# Patient Record
Sex: MALE | Race: WHITE | NOT HISPANIC OR LATINO | URBAN - METROPOLITAN AREA
[De-identification: names, ages, dates, MRNs, and addresses within clinical notes are randomized per-mention and may not be internally consistent; named-entity substitution may affect disease eponyms.]

---

## 2017-05-31 ENCOUNTER — EMERGENCY (EMERGENCY)
Facility: HOSPITAL | Age: 47
LOS: 1 days | Discharge: HOME | End: 2017-05-31
Attending: EMERGENCY MEDICINE | Admitting: EMERGENCY MEDICINE
Payer: COMMERCIAL

## 2017-05-31 VITALS
SYSTOLIC BLOOD PRESSURE: 139 MMHG | WEIGHT: 250 LBS | TEMPERATURE: 98 F | DIASTOLIC BLOOD PRESSURE: 96 MMHG | RESPIRATION RATE: 19 BRPM | OXYGEN SATURATION: 100 % | HEART RATE: 75 BPM

## 2017-05-31 LAB
ALBUMIN SERPL ELPH-MCNC: 4.5 G/DL — SIGNIFICANT CHANGE UP (ref 3.3–5)
ALP SERPL-CCNC: 51 U/L — SIGNIFICANT CHANGE UP (ref 40–120)
ALT FLD-CCNC: 24 U/L — SIGNIFICANT CHANGE UP (ref 4–41)
APTT BLD: 38.6 SEC — HIGH (ref 27.5–37.4)
AST SERPL-CCNC: 23 U/L — SIGNIFICANT CHANGE UP (ref 4–40)
BASOPHILS # BLD AUTO: 0.01 K/UL — SIGNIFICANT CHANGE UP (ref 0–0.2)
BASOPHILS NFR BLD AUTO: 0.2 % — SIGNIFICANT CHANGE UP (ref 0–2)
BILIRUB SERPL-MCNC: 0.3 MG/DL — SIGNIFICANT CHANGE UP (ref 0.2–1.2)
BUN SERPL-MCNC: 17 MG/DL — SIGNIFICANT CHANGE UP (ref 7–23)
CALCIUM SERPL-MCNC: 9.7 MG/DL — SIGNIFICANT CHANGE UP (ref 8.4–10.5)
CHLORIDE SERPL-SCNC: 102 MMOL/L — SIGNIFICANT CHANGE UP (ref 98–107)
CO2 SERPL-SCNC: 28 MMOL/L — SIGNIFICANT CHANGE UP (ref 22–31)
CREAT SERPL-MCNC: 1.25 MG/DL — SIGNIFICANT CHANGE UP (ref 0.5–1.3)
EOSINOPHIL # BLD AUTO: 0.13 K/UL — SIGNIFICANT CHANGE UP (ref 0–0.5)
EOSINOPHIL NFR BLD AUTO: 2.1 % — SIGNIFICANT CHANGE UP (ref 0–6)
GLUCOSE SERPL-MCNC: 111 MG/DL — HIGH (ref 70–99)
HCT VFR BLD CALC: 45 % — SIGNIFICANT CHANGE UP (ref 39–50)
HGB BLD-MCNC: 14.7 G/DL — SIGNIFICANT CHANGE UP (ref 13–17)
IMM GRANULOCYTES NFR BLD AUTO: 0.2 % — SIGNIFICANT CHANGE UP (ref 0–1.5)
INR BLD: 1.51 — HIGH (ref 0.88–1.17)
LYMPHOCYTES # BLD AUTO: 3.22 K/UL — SIGNIFICANT CHANGE UP (ref 1–3.3)
LYMPHOCYTES # BLD AUTO: 51.5 % — HIGH (ref 13–44)
MCHC RBC-ENTMCNC: 32.7 % — SIGNIFICANT CHANGE UP (ref 32–36)
MCHC RBC-ENTMCNC: 32.7 PG — SIGNIFICANT CHANGE UP (ref 27–34)
MCV RBC AUTO: 100.2 FL — HIGH (ref 80–100)
MONOCYTES # BLD AUTO: 0.67 K/UL — SIGNIFICANT CHANGE UP (ref 0–0.9)
MONOCYTES NFR BLD AUTO: 10.7 % — SIGNIFICANT CHANGE UP (ref 2–14)
NEUTROPHILS # BLD AUTO: 2.21 K/UL — SIGNIFICANT CHANGE UP (ref 1.8–7.4)
NEUTROPHILS NFR BLD AUTO: 35.3 % — LOW (ref 43–77)
PLATELET # BLD AUTO: 221 K/UL — SIGNIFICANT CHANGE UP (ref 150–400)
PMV BLD: 9.5 FL — SIGNIFICANT CHANGE UP (ref 7–13)
POTASSIUM SERPL-MCNC: 4.5 MMOL/L — SIGNIFICANT CHANGE UP (ref 3.5–5.3)
POTASSIUM SERPL-SCNC: 4.5 MMOL/L — SIGNIFICANT CHANGE UP (ref 3.5–5.3)
PROT SERPL-MCNC: 7.8 G/DL — SIGNIFICANT CHANGE UP (ref 6–8.3)
PROTHROM AB SERPL-ACNC: 17 SEC — HIGH (ref 9.8–13.1)
RBC # BLD: 4.49 M/UL — SIGNIFICANT CHANGE UP (ref 4.2–5.8)
RBC # FLD: 13.3 % — SIGNIFICANT CHANGE UP (ref 10.3–14.5)
SODIUM SERPL-SCNC: 143 MMOL/L — SIGNIFICANT CHANGE UP (ref 135–145)
WBC # BLD: 6.25 K/UL — SIGNIFICANT CHANGE UP (ref 3.8–10.5)
WBC # FLD AUTO: 6.25 K/UL — SIGNIFICANT CHANGE UP (ref 3.8–10.5)

## 2017-05-31 PROCEDURE — 73502 X-RAY EXAM HIP UNI 2-3 VIEWS: CPT | Mod: 26,RT

## 2017-05-31 PROCEDURE — 99285 EMERGENCY DEPT VISIT HI MDM: CPT

## 2017-05-31 RX ORDER — OXYCODONE HYDROCHLORIDE 5 MG/1
5 TABLET ORAL ONCE
Qty: 0 | Refills: 0 | Status: DISCONTINUED | OUTPATIENT
Start: 2017-05-31 | End: 2017-05-31

## 2017-05-31 RX ORDER — MORPHINE SULFATE 50 MG/1
4 CAPSULE, EXTENDED RELEASE ORAL ONCE
Qty: 0 | Refills: 0 | Status: DISCONTINUED | OUTPATIENT
Start: 2017-05-31 | End: 2017-05-31

## 2017-05-31 RX ADMIN — OXYCODONE HYDROCHLORIDE 5 MILLIGRAM(S): 5 TABLET ORAL at 22:03

## 2017-05-31 RX ADMIN — MORPHINE SULFATE 4 MILLIGRAM(S): 50 CAPSULE, EXTENDED RELEASE ORAL at 23:20

## 2017-05-31 RX ADMIN — MORPHINE SULFATE 4 MILLIGRAM(S): 50 CAPSULE, EXTENDED RELEASE ORAL at 23:35

## 2017-05-31 RX ADMIN — OXYCODONE HYDROCHLORIDE 5 MILLIGRAM(S): 5 TABLET ORAL at 23:03

## 2017-05-31 NOTE — ED PROVIDER NOTE - MEDICAL DECISION MAKING DETAILS
att48 y/o m with h/o pe,/dvt, on coumadin, presents with h/o groin injury, presents with r hip/groing pain for 12 days. Pt was doing a lot of walking at a conference.  Pt lifted heavy and felt sharp pain tonight. Can walk with pain. Took tylenol pta. Worse with movement. palp dp. NV intact bl. pelvis stable, hip NTTP. likely msk. Check INR, X-ray, pain control, ice and reassessment.

## 2017-05-31 NOTE — ED PROVIDER NOTE - ATTENDING CONTRIBUTION TO CARE
att46 y/o m with h/o pe,/dvt, on coumadin, presents with h/o groin injury, presents with r hip/groing pain for 12 days. Pt was doing a lot of walking at a conference.  Pt lifted heavy and felt sharp pain tonight. Can walk with pain. Took tylenol pta. Worse with movement. palp dp. NV intact bl. pelvis stable, hip NTTp. likely msk. Check INR, X-ray, pain control, ice and reassessment. see mdm.  I have personally performed a face to face bedside history and physical examination of this patient. I have discussed the history, examination, review of systems, assessment and plan of management with the resident. I have reviewed the electronic medical record and amended it to reflect my history, review of systems, physical exam, assessment and plan.

## 2017-05-31 NOTE — ED PROVIDER NOTE - NEUROLOGICAL, MLM
Alert and oriented, no focal deficits, no motor or sensory deficits. Alert and oriented, no focal deficits, no motor or sensory deficits. ms 5/5 and sensation intact.

## 2017-05-31 NOTE — ED PROVIDER NOTE - LOCATION
rangle of motion at right hip limited by pain. able to lift right knee. Negative leg roll. No tenderness to palpation at hip joint.  No hernia on exam bilaterally/hip r groin/hip range of motion at right hip limited by pain. able to lift right knee. Negative leg roll. No tenderness to palpation at hip joint.  No hernia on exam bilaterally/hip

## 2017-05-31 NOTE — ED ADULT TRIAGE NOTE - CHIEF COMPLAINT QUOTE
PMH PEx2  ambulatory to triage, c/o R hip pain PMH PEx2  ambulatory to triage, c/o R hip pain  pt is on Coumadin marty DVTs

## 2017-05-31 NOTE — ED PROVIDER NOTE - OBJECTIVE STATEMENT
47M with DVT/PE on coumadin presents with sudden sharp pain right above the right hip since this afternoon. Patient says he has a remote hx of groin injury. For the last 12 days he has had intermittent pain above the right groin. Today he was lifting a five pound walker when he felt a sharp tearing sensation and had significant pain that prompted him to come to the ED. Took tylenol at home for the pain. Can still move the legs but movement is limited on right side due to pain. 47M with DVT/PE on coumadin presents with sudden sharp pain right above the right hip since this afternoon. Patient says he has a remote hx of groin injury. For the last 12 days he has had intermittent pain above the right groin, worse after walking at a conference. Today he was lifting a five pound walker when he felt a sharp tearing sensation and had significant pain that prompted him to come to the ED. Took tylenol at home for the pain. Can still move the legs but movement is limited on right side due to pain.

## 2017-06-01 VITALS
OXYGEN SATURATION: 100 % | HEART RATE: 70 BPM | RESPIRATION RATE: 16 BRPM | SYSTOLIC BLOOD PRESSURE: 154 MMHG | DIASTOLIC BLOOD PRESSURE: 88 MMHG

## 2017-06-01 RX ORDER — CYCLOBENZAPRINE HYDROCHLORIDE 10 MG/1
5 TABLET, FILM COATED ORAL ONCE
Qty: 0 | Refills: 0 | Status: COMPLETED | OUTPATIENT
Start: 2017-06-01 | End: 2017-06-01

## 2017-06-01 RX ORDER — CYCLOBENZAPRINE HYDROCHLORIDE 10 MG/1
1 TABLET, FILM COATED ORAL
Qty: 5 | Refills: 0 | OUTPATIENT
Start: 2017-06-01

## 2017-06-01 RX ADMIN — CYCLOBENZAPRINE HYDROCHLORIDE 5 MILLIGRAM(S): 10 TABLET, FILM COATED ORAL at 00:42

## 2017-10-19 ENCOUNTER — EMERGENCY (EMERGENCY)
Facility: HOSPITAL | Age: 47
LOS: 1 days | Discharge: HOME | End: 2017-10-19
Attending: EMERGENCY MEDICINE | Admitting: EMERGENCY MEDICINE
Payer: COMMERCIAL

## 2017-10-19 VITALS
TEMPERATURE: 97 F | HEART RATE: 79 BPM | SYSTOLIC BLOOD PRESSURE: 140 MMHG | DIASTOLIC BLOOD PRESSURE: 93 MMHG | RESPIRATION RATE: 19 BRPM | OXYGEN SATURATION: 99 %

## 2017-10-19 VITALS
HEART RATE: 99 BPM | DIASTOLIC BLOOD PRESSURE: 90 MMHG | OXYGEN SATURATION: 100 % | RESPIRATION RATE: 18 BRPM | TEMPERATURE: 98 F | SYSTOLIC BLOOD PRESSURE: 141 MMHG

## 2017-10-19 LAB
ALBUMIN SERPL ELPH-MCNC: 4.4 G/DL — SIGNIFICANT CHANGE UP (ref 3.3–5)
ALP SERPL-CCNC: 39 U/L — LOW (ref 40–120)
ALT FLD-CCNC: 32 U/L — SIGNIFICANT CHANGE UP (ref 4–41)
APTT BLD: 34.9 SEC — SIGNIFICANT CHANGE UP (ref 27.5–37.4)
AST SERPL-CCNC: 29 U/L — SIGNIFICANT CHANGE UP (ref 4–40)
BASE EXCESS BLDV CALC-SCNC: 1.1 MMOL/L — SIGNIFICANT CHANGE UP
BASOPHILS # BLD AUTO: 0.02 K/UL — SIGNIFICANT CHANGE UP (ref 0–0.2)
BASOPHILS NFR BLD AUTO: 0.3 % — SIGNIFICANT CHANGE UP (ref 0–2)
BILIRUB SERPL-MCNC: 0.5 MG/DL — SIGNIFICANT CHANGE UP (ref 0.2–1.2)
BLOOD GAS VENOUS - CREATININE: 1.06 MG/DL — SIGNIFICANT CHANGE UP (ref 0.5–1.3)
BUN SERPL-MCNC: 21 MG/DL — SIGNIFICANT CHANGE UP (ref 7–23)
CALCIUM SERPL-MCNC: 9.2 MG/DL — SIGNIFICANT CHANGE UP (ref 8.4–10.5)
CHLORIDE BLDV-SCNC: 110 MMOL/L — HIGH (ref 96–108)
CHLORIDE SERPL-SCNC: 106 MMOL/L — SIGNIFICANT CHANGE UP (ref 98–107)
CO2 SERPL-SCNC: 25 MMOL/L — SIGNIFICANT CHANGE UP (ref 22–31)
CREAT SERPL-MCNC: 1.11 MG/DL — SIGNIFICANT CHANGE UP (ref 0.5–1.3)
EOSINOPHIL # BLD AUTO: 0.11 K/UL — SIGNIFICANT CHANGE UP (ref 0–0.5)
EOSINOPHIL NFR BLD AUTO: 1.9 % — SIGNIFICANT CHANGE UP (ref 0–6)
GAS PNL BLDV: 137 MMOL/L — SIGNIFICANT CHANGE UP (ref 136–146)
GLUCOSE BLDV-MCNC: 102 — HIGH (ref 70–99)
GLUCOSE SERPL-MCNC: 100 MG/DL — HIGH (ref 70–99)
HCO3 BLDV-SCNC: 25 MMOL/L — SIGNIFICANT CHANGE UP (ref 20–27)
HCT VFR BLD CALC: 42.9 % — SIGNIFICANT CHANGE UP (ref 39–50)
HCT VFR BLDV CALC: 46.5 % — SIGNIFICANT CHANGE UP (ref 39–51)
HGB BLD-MCNC: 14.1 G/DL — SIGNIFICANT CHANGE UP (ref 13–17)
HGB BLDV-MCNC: 15.2 G/DL — SIGNIFICANT CHANGE UP (ref 13–17)
IMM GRANULOCYTES # BLD AUTO: 0.01 # — SIGNIFICANT CHANGE UP
IMM GRANULOCYTES NFR BLD AUTO: 0.2 % — SIGNIFICANT CHANGE UP (ref 0–1.5)
INR BLD: 1.66 — HIGH (ref 0.88–1.17)
LACTATE BLDV-MCNC: 1.8 MMOL/L — SIGNIFICANT CHANGE UP (ref 0.5–2)
LYMPHOCYTES # BLD AUTO: 1.98 K/UL — SIGNIFICANT CHANGE UP (ref 1–3.3)
LYMPHOCYTES # BLD AUTO: 34.4 % — SIGNIFICANT CHANGE UP (ref 13–44)
MCHC RBC-ENTMCNC: 32.3 PG — SIGNIFICANT CHANGE UP (ref 27–34)
MCHC RBC-ENTMCNC: 32.9 % — SIGNIFICANT CHANGE UP (ref 32–36)
MCV RBC AUTO: 98.4 FL — SIGNIFICANT CHANGE UP (ref 80–100)
MONOCYTES # BLD AUTO: 0.49 K/UL — SIGNIFICANT CHANGE UP (ref 0–0.9)
MONOCYTES NFR BLD AUTO: 8.5 % — SIGNIFICANT CHANGE UP (ref 2–14)
NEUTROPHILS # BLD AUTO: 3.14 K/UL — SIGNIFICANT CHANGE UP (ref 1.8–7.4)
NEUTROPHILS NFR BLD AUTO: 54.7 % — SIGNIFICANT CHANGE UP (ref 43–77)
NRBC # FLD: 0 — SIGNIFICANT CHANGE UP
PCO2 BLDV: 45 MMHG — SIGNIFICANT CHANGE UP (ref 41–51)
PH BLDV: 7.38 PH — SIGNIFICANT CHANGE UP (ref 7.32–7.43)
PLATELET # BLD AUTO: 208 K/UL — SIGNIFICANT CHANGE UP (ref 150–400)
PMV BLD: 9.7 FL — SIGNIFICANT CHANGE UP (ref 7–13)
PO2 BLDV: 40 MMHG — SIGNIFICANT CHANGE UP (ref 35–40)
POTASSIUM BLDV-SCNC: 4.1 MMOL/L — SIGNIFICANT CHANGE UP (ref 3.4–4.5)
POTASSIUM SERPL-MCNC: 4.1 MMOL/L — SIGNIFICANT CHANGE UP (ref 3.5–5.3)
POTASSIUM SERPL-SCNC: 4.1 MMOL/L — SIGNIFICANT CHANGE UP (ref 3.5–5.3)
PROT SERPL-MCNC: 7.6 G/DL — SIGNIFICANT CHANGE UP (ref 6–8.3)
PROTHROM AB SERPL-ACNC: 18.8 SEC — HIGH (ref 9.8–13.1)
RBC # BLD: 4.36 M/UL — SIGNIFICANT CHANGE UP (ref 4.2–5.8)
RBC # FLD: 12.8 % — SIGNIFICANT CHANGE UP (ref 10.3–14.5)
SAO2 % BLDV: 77.3 % — SIGNIFICANT CHANGE UP (ref 60–85)
SODIUM SERPL-SCNC: 142 MMOL/L — SIGNIFICANT CHANGE UP (ref 135–145)
WBC # BLD: 5.75 K/UL — SIGNIFICANT CHANGE UP (ref 3.8–10.5)
WBC # FLD AUTO: 5.75 K/UL — SIGNIFICANT CHANGE UP (ref 3.8–10.5)

## 2017-10-19 PROCEDURE — 99285 EMERGENCY DEPT VISIT HI MDM: CPT

## 2017-10-19 PROCEDURE — 70450 CT HEAD/BRAIN W/O DYE: CPT | Mod: 26

## 2017-10-19 RX ORDER — METOCLOPRAMIDE HCL 10 MG
10 TABLET ORAL ONCE
Qty: 0 | Refills: 0 | Status: COMPLETED | OUTPATIENT
Start: 2017-10-19 | End: 2017-10-19

## 2017-10-19 RX ORDER — SODIUM CHLORIDE 9 MG/ML
1000 INJECTION INTRAMUSCULAR; INTRAVENOUS; SUBCUTANEOUS ONCE
Qty: 0 | Refills: 0 | Status: COMPLETED | OUTPATIENT
Start: 2017-10-19 | End: 2017-10-19

## 2017-10-19 RX ADMIN — Medication 10 MILLIGRAM(S): at 16:19

## 2017-10-19 RX ADMIN — SODIUM CHLORIDE 1000 MILLILITER(S): 9 INJECTION INTRAMUSCULAR; INTRAVENOUS; SUBCUTANEOUS at 16:19

## 2017-10-19 NOTE — ED PROVIDER NOTE - MEDICAL DECISION MAKING DETAILS
47yoM hx of dvt/pe on coumadin for life pw head trauma. no loc, vomiting, focal defectis.  due to coumadin will check inr, labs, ct head and give meds for headache.

## 2017-10-19 NOTE — ED ADULT NURSE NOTE - OBJECTIVE STATEMENT
Patient to room 25 with c/o headaches for the past two days after he hit his head on a trunk. Pt is on Coumadin. pt evaluated and treated by MD. IVL placed to left AC 20 gauge and labs drawn and sent. Pt is awaiting CT Scan and results and is comfortable.  RIMMA Morales

## 2017-10-19 NOTE — ED PROVIDER NOTE - ATTENDING CONTRIBUTION TO CARE
CRISTAL Epstein: I have personally performed a face to face bedside history and physical examination of this patient. I have discussed the history, examination, review of systems, assessment and plan of management with the resident. I have reviewed the electronic medical record and amended it to reflect my history, review of systems, physical exam, assessment and plan.    Patient p/w ha-hit his head on trunk of car 2 days ago, has had constant, nonradiating, moderate posterior ha since then. Has had similar headaches before 2/2 prior concussion but as patient is on coumadin, here to ensure no bleeding. Denies weakness, numbness, vision changes, vomiting, fever, cp sob, diarrhea, dysuria. H/o multiple dvt/pe in past.  exam  GEN - NAD; well appearing; A+O x3   HEAD - NC/AT   EYES- PERRL, EOMI  ENT: Airway patent, mmm, Oral cavity and pharynx normal. No inflammation, swelling, exudate, or lesions.  NECK: Neck supple, non-tender without lymphadenopathy, no masses.  PULMONARY - CTA b/l, symmetric breath sounds.   CARDIAC -s1s2, RRR, no M,G,R  ABDOMEN - +BS, ND, NT, soft, no guarding, no rebound, no masses   BACK - no CVA tenderness, Normal  spine   EXTREMITIES - symmetric pulses, capillary refill < 2 seconds, no clubbing, no cyanosis, no edema   SKIN - no rash or bruising   NEUROLOGIC - alert, CN 2-12 intact, strength 5/5 diffusely, sensation intact, no cerebellar signs, gait steady.  PSYCH -nl mood/affect, nl insight.  a/p-head trauma 2 d ago with constant posterior headache since the, h/o concussions, on coumadin, no neuro deficits, well appearing, vss. will check labs, ct head, reass.

## 2017-10-19 NOTE — ED PROVIDER NOTE - OBJECTIVE STATEMENT
47yoM hx of DVT/PE coumadin pw head pain after hitting the back of his head on the car trunk 2 days ago. has been having a worsening headache since then. last INR was between 2-3. denies loc, vomiting, weakness, numbness, tingling, slurred speech or bleeding.

## 2017-10-19 NOTE — ED ADULT TRIAGE NOTE - CHIEF COMPLAINT QUOTE
pt hit the back of his head on the trunk of his car two days ago  he is on coumadin ( for multiple dvt's)  states he has  increased severity of headaches that he normally has (due to concussion many years ago)  denies NV

## 2017-10-19 NOTE — ED PROVIDER NOTE - PROGRESS NOTE DETAILS
All results d/w patient and copies given with instructions to bring with them to their follow up appointment.  The patient was given verbal and written discharge instructions Specifically, instructions when to return to the ED and to seek follow-up from their pcp within 1-2 days. Any specialty follow-up was discussed, including how to make an appointment.  Instructions were discussed in simple, plain language and was understood by the patient. The patient understands that should their symptoms worsen or any new symptoms arise, they should return to the ED immediately for further evaluation.  Vss, NAD, tolerating po and ambulating steadily at discharge.

## 2017-10-26 ENCOUNTER — APPOINTMENT (OUTPATIENT)
Dept: PAIN MANAGEMENT | Facility: CLINIC | Age: 47
End: 2017-10-26
Payer: COMMERCIAL

## 2017-10-26 VITALS
HEART RATE: 96 BPM | WEIGHT: 255 LBS | DIASTOLIC BLOOD PRESSURE: 88 MMHG | HEIGHT: 72 IN | BODY MASS INDEX: 34.54 KG/M2 | SYSTOLIC BLOOD PRESSURE: 148 MMHG

## 2017-10-26 PROCEDURE — 99204 OFFICE O/P NEW MOD 45 MIN: CPT

## 2018-01-22 ENCOUNTER — EMERGENCY (EMERGENCY)
Facility: HOSPITAL | Age: 48
LOS: 1 days | Discharge: HOME | End: 2018-01-22
Attending: EMERGENCY MEDICINE | Admitting: EMERGENCY MEDICINE
Payer: COMMERCIAL

## 2018-01-22 VITALS
OXYGEN SATURATION: 98 % | SYSTOLIC BLOOD PRESSURE: 138 MMHG | RESPIRATION RATE: 16 BRPM | HEART RATE: 76 BPM | DIASTOLIC BLOOD PRESSURE: 88 MMHG | TEMPERATURE: 98 F

## 2018-01-22 VITALS
SYSTOLIC BLOOD PRESSURE: 152 MMHG | OXYGEN SATURATION: 100 % | DIASTOLIC BLOOD PRESSURE: 103 MMHG | TEMPERATURE: 98 F | HEART RATE: 75 BPM | RESPIRATION RATE: 13 BRPM

## 2018-01-22 PROCEDURE — 99284 EMERGENCY DEPT VISIT MOD MDM: CPT

## 2018-01-22 RX ORDER — FAMOTIDINE 10 MG/ML
20 INJECTION INTRAVENOUS ONCE
Qty: 0 | Refills: 0 | Status: COMPLETED | OUTPATIENT
Start: 2018-01-22 | End: 2018-01-22

## 2018-01-22 RX ADMIN — FAMOTIDINE 20 MILLIGRAM(S): 10 INJECTION INTRAVENOUS at 23:56

## 2018-01-22 RX ADMIN — Medication 30 MILLILITER(S): at 23:55

## 2018-01-22 NOTE — ED PROVIDER NOTE - CARE PLAN
Principal Discharge DX:	Chest pain  Assessment and plan of treatment:	Follow up with Dr. Currie this week for repeat evaluation, call tomorrow morning to discuss your INR value. Return here to the emergency department for any new signs of worsening such as severe shortness of breath or other new acute worries.

## 2018-01-22 NOTE — ED PROVIDER NOTE - ATTENDING CONTRIBUTION TO CARE
SISSY Attending Note - Dr. Blount  46 y/o maale with hx GERD and pulmonary emboli >2.5 years ago chronically on coumadin now w/ left sided chest pain abrupt onset 3:30 PM, now 8 hours ago, described as a tightness with burning and stabbing sensation intermittent in severity w/ cycles of pain every 4-5 minutes. Pt states that this pain is different from his PE pain.  PE: pt is alert and oriented, perrl, ent normal, membranes are moist, neck supple. no lymphadenopathy or thyroid enlargement, No JVD.  Chest clear to P&A, Heart- reg rhythm without murmur, rubs or gallops, radial pulses equal bilaterally.  Abd is soft, non-tender, Bowel sounds are active. no mass or organomegaly. : No CVA tenderness. Neuro:  Pt alert and oriented x 3. Perrl    Distal neurosensory is intact. Motor function is 5/5 strength bilaterally.  No focal deficits. Extremities:  No edema. Pt has reproducible pain on left arm movement Skin: warm and dry.  Impression:  Muscular skeletal pain  Plan: labs, EKG and one troponin

## 2018-01-22 NOTE — ED PROVIDER NOTE - PLAN OF CARE
Follow up with Dr. Currie this week for repeat evaluation, call tomorrow morning to discuss your INR value. Return here to the emergency department for any new signs of worsening such as severe shortness of breath or other new acute worries.

## 2018-01-22 NOTE — ED PROVIDER NOTE - OBJECTIVE STATEMENT
Hx GERD and pulmonary emboli >2.5 years ago chronically on coumadin now w/ left sided chest pain abrupt onset 3:30 PM, now 8 hours ago, described as a tightness with burning and stabbing sensation intermittent in severity w/ cycles of pain every 4-5 minutes. His pain is worsened with movement of his left arm "sometimes", but does not seem to change with walking, breathing, or other movements. He has not noticed any shortness of breath or sweating associated with the pain. He has tried taking tums for the pain ~2 hours after onset but did not have significant improvement. He has not felt light headed, no recent fevers, chills, nausea, vomiting, or any abdominal pain. There is no radiation of the pain to his neck, arms, back, or elsewhere.    PMD pulmonologist Dr. Currie in [place]

## 2018-01-22 NOTE — ED PROVIDER NOTE - PHYSICAL EXAMINATION
Gen: NAD, non-toxic, conversational  Eyes: PERRL, EOMI   HENT: Normocephalic, atraumatic. External ears normal, no rhinorrhea, moist mucous membranes.   CV: RRR, no M/R/G, 2+ radial and posterior tibialis pulses symmetric bilaterally, no peripheral edema  Resp: CTAB, non-labored, speaking without difficulty on room air  Abd: soft, non tender, non rigid, no guarding or rebound tenderness  Skin: dry, wwp   Neuro: AOx3, speech is fluent and appropriate  Psych: Mood good, affect euthymic Gen: NAD, non-toxic, conversational  Eyes: PERRL, EOMI   HENT: Normocephalic, atraumatic. External ears normal, no rhinorrhea, moist mucous membranes.   CV: RRR, no M/R/G, 2+ radial and posterior tibialis pulses symmetric bilaterally, no peripheral edema  Resp: CTAB, non-labored, speaking without difficulty on room air  Abd: soft, non tender, non rigid, no guarding or rebound tenderness  Skin: dry, wwp   Neuro: AOx3, speech is fluent and appropriate  Psych: Mood good, affect euthymic  Muscular-skeletal- no edema

## 2018-01-22 NOTE — ED PROVIDER NOTE - NS ED ROS FT
General: No fevers / chills  HENT: No ear pain, runny nose, or sore throat  Eyes: No visual changes  CP: +chest pain, no palpitations, no light headedness  Resp: No shortness of breath, no cough  GI: No abdominal pain, diarrhea, constipation, nausea, or vomiting  : No urinary fz, dysuria, or hematuria  Neuro: No numbness, tingling, or weakness  Endo: No hx of diabetes  Heme: +anticoagulation on warfarin General: No fevers / chills  HENT: No ear pain, runny nose, or sore throat  Eyes: No visual changes  CP: +chest pain, no palpitations, no light headedness  Resp: No shortness of breath, no cough  GI: No abdominal pain, diarrhea, constipation, nausea, or vomiting  : No urinary fz, dysuria, or hematuria  Neuro: No numbness, tingling, or weakness  Endo: No hx of diabetes  Heme: +anticoagulation on warfarin  Muscular skeletal- No leg swelling

## 2018-01-22 NOTE — ED PROVIDER NOTE - MEDICAL DECISION MAKING DETAILS
47M hx GERD and PE on coumadin presenting for evaluation of chest pain. Given that the pain has been present for the past 8 hours will evaluate with one set of cardiacs, also coags given the patient's hx of PE. Will tx with reflux cocktail in the interim. F/u studies, reassess, dispo.

## 2018-01-22 NOTE — ED ADULT TRIAGE NOTE - CHIEF COMPLAINT QUOTE
Pt with left sided CP. Pt describes it as a tightness with an occasional stabbing feeling. Pain started today.

## 2018-01-23 LAB
ALBUMIN SERPL ELPH-MCNC: 4.6 G/DL — SIGNIFICANT CHANGE UP (ref 3.3–5)
ALP SERPL-CCNC: 40 U/L — SIGNIFICANT CHANGE UP (ref 40–120)
ALT FLD-CCNC: 22 U/L — SIGNIFICANT CHANGE UP (ref 4–41)
APTT BLD: 35.5 SEC — SIGNIFICANT CHANGE UP (ref 27.5–37.4)
AST SERPL-CCNC: 20 U/L — SIGNIFICANT CHANGE UP (ref 4–40)
BASOPHILS # BLD AUTO: 0.02 K/UL — SIGNIFICANT CHANGE UP (ref 0–0.2)
BASOPHILS NFR BLD AUTO: 0.3 % — SIGNIFICANT CHANGE UP (ref 0–2)
BILIRUB SERPL-MCNC: 0.5 MG/DL — SIGNIFICANT CHANGE UP (ref 0.2–1.2)
BUN SERPL-MCNC: 17 MG/DL — SIGNIFICANT CHANGE UP (ref 7–23)
CALCIUM SERPL-MCNC: 9.7 MG/DL — SIGNIFICANT CHANGE UP (ref 8.4–10.5)
CHLORIDE SERPL-SCNC: 105 MMOL/L — SIGNIFICANT CHANGE UP (ref 98–107)
CK MB BLD-MCNC: 1.07 NG/ML — SIGNIFICANT CHANGE UP (ref 1–6.6)
CK SERPL-CCNC: 69 U/L — SIGNIFICANT CHANGE UP (ref 30–200)
CO2 SERPL-SCNC: 27 MMOL/L — SIGNIFICANT CHANGE UP (ref 22–31)
CREAT SERPL-MCNC: 1.1 MG/DL — SIGNIFICANT CHANGE UP (ref 0.5–1.3)
EOSINOPHIL # BLD AUTO: 0.11 K/UL — SIGNIFICANT CHANGE UP (ref 0–0.5)
EOSINOPHIL NFR BLD AUTO: 1.7 % — SIGNIFICANT CHANGE UP (ref 0–6)
GLUCOSE SERPL-MCNC: 93 MG/DL — SIGNIFICANT CHANGE UP (ref 70–99)
HCT VFR BLD CALC: 44.7 % — SIGNIFICANT CHANGE UP (ref 39–50)
HGB BLD-MCNC: 15.2 G/DL — SIGNIFICANT CHANGE UP (ref 13–17)
IMM GRANULOCYTES # BLD AUTO: 0.01 # — SIGNIFICANT CHANGE UP
IMM GRANULOCYTES NFR BLD AUTO: 0.2 % — SIGNIFICANT CHANGE UP (ref 0–1.5)
INR BLD: 1.51 — HIGH (ref 0.88–1.17)
LYMPHOCYTES # BLD AUTO: 2.67 K/UL — SIGNIFICANT CHANGE UP (ref 1–3.3)
LYMPHOCYTES # BLD AUTO: 41.3 % — SIGNIFICANT CHANGE UP (ref 13–44)
MCHC RBC-ENTMCNC: 33.8 PG — SIGNIFICANT CHANGE UP (ref 27–34)
MCHC RBC-ENTMCNC: 34 % — SIGNIFICANT CHANGE UP (ref 32–36)
MCV RBC AUTO: 99.3 FL — SIGNIFICANT CHANGE UP (ref 80–100)
MONOCYTES # BLD AUTO: 0.58 K/UL — SIGNIFICANT CHANGE UP (ref 0–0.9)
MONOCYTES NFR BLD AUTO: 9 % — SIGNIFICANT CHANGE UP (ref 2–14)
NEUTROPHILS # BLD AUTO: 3.07 K/UL — SIGNIFICANT CHANGE UP (ref 1.8–7.4)
NEUTROPHILS NFR BLD AUTO: 47.5 % — SIGNIFICANT CHANGE UP (ref 43–77)
NRBC # FLD: 0 — SIGNIFICANT CHANGE UP
PLATELET # BLD AUTO: 217 K/UL — SIGNIFICANT CHANGE UP (ref 150–400)
PMV BLD: 9.7 FL — SIGNIFICANT CHANGE UP (ref 7–13)
POTASSIUM SERPL-MCNC: 4.8 MMOL/L — SIGNIFICANT CHANGE UP (ref 3.5–5.3)
POTASSIUM SERPL-SCNC: 4.8 MMOL/L — SIGNIFICANT CHANGE UP (ref 3.5–5.3)
PROT SERPL-MCNC: 8 G/DL — SIGNIFICANT CHANGE UP (ref 6–8.3)
PROTHROM AB SERPL-ACNC: 17.5 SEC — HIGH (ref 9.8–13.1)
RBC # BLD: 4.5 M/UL — SIGNIFICANT CHANGE UP (ref 4.2–5.8)
RBC # FLD: 12.7 % — SIGNIFICANT CHANGE UP (ref 10.3–14.5)
SODIUM SERPL-SCNC: 145 MMOL/L — SIGNIFICANT CHANGE UP (ref 135–145)
TROPONIN T SERPL-MCNC: < 0.06 NG/ML — SIGNIFICANT CHANGE UP (ref 0–0.06)
WBC # BLD: 6.46 K/UL — SIGNIFICANT CHANGE UP (ref 3.8–10.5)
WBC # FLD AUTO: 6.46 K/UL — SIGNIFICANT CHANGE UP (ref 3.8–10.5)

## 2018-01-26 ENCOUNTER — TRANSCRIPTION ENCOUNTER (OUTPATIENT)
Age: 48
End: 2018-01-26

## 2018-02-13 ENCOUNTER — NON-APPOINTMENT (OUTPATIENT)
Age: 48
End: 2018-02-13

## 2018-02-13 ENCOUNTER — APPOINTMENT (OUTPATIENT)
Dept: CARDIOLOGY | Facility: CLINIC | Age: 48
End: 2018-02-13
Payer: COMMERCIAL

## 2018-02-13 VITALS
OXYGEN SATURATION: 99 % | BODY MASS INDEX: 33.86 KG/M2 | SYSTOLIC BLOOD PRESSURE: 138 MMHG | HEART RATE: 73 BPM | WEIGHT: 250 LBS | HEIGHT: 72 IN | DIASTOLIC BLOOD PRESSURE: 92 MMHG

## 2018-02-13 DIAGNOSIS — G43.909 MIGRAINE, UNSPECIFIED, NOT INTRACTABLE, W/OUT STATUS MIGRAINOSUS: ICD-10-CM

## 2018-02-13 DIAGNOSIS — Z87.820 PERSONAL HISTORY OF TRAUMATIC BRAIN INJURY: ICD-10-CM

## 2018-02-13 DIAGNOSIS — Z78.9 OTHER SPECIFIED HEALTH STATUS: ICD-10-CM

## 2018-02-13 PROCEDURE — 93000 ELECTROCARDIOGRAM COMPLETE: CPT

## 2018-02-13 PROCEDURE — 99245 OFF/OP CONSLTJ NEW/EST HI 55: CPT

## 2018-02-13 RX ORDER — TOPIRAMATE 25 MG/1
25 CAPSULE, EXTENDED RELEASE ORAL DAILY
Qty: 120 | Refills: 2 | Status: DISCONTINUED | COMMUNITY
Start: 2017-10-26 | End: 2018-02-13

## 2018-02-25 PROBLEM — G43.909 MIGRAINE: Status: ACTIVE | Noted: 2017-10-26

## 2018-02-25 PROBLEM — G43.909 MIGRAINE: Status: RESOLVED | Noted: 2017-10-26 | Resolved: 2018-02-25

## 2018-02-25 PROBLEM — Z87.820 HISTORY OF CONCUSSION: Status: RESOLVED | Noted: 2017-10-26 | Resolved: 2018-02-25

## 2018-02-28 ENCOUNTER — OUTPATIENT (OUTPATIENT)
Dept: OUTPATIENT SERVICES | Facility: HOSPITAL | Age: 48
LOS: 1 days | End: 2018-02-28
Payer: COMMERCIAL

## 2018-02-28 ENCOUNTER — APPOINTMENT (OUTPATIENT)
Dept: CV DIAGNOSITCS | Facility: HOSPITAL | Age: 48
End: 2018-02-28

## 2018-02-28 DIAGNOSIS — I27.82 CHRONIC PULMONARY EMBOLISM: ICD-10-CM

## 2018-02-28 PROCEDURE — 93306 TTE W/DOPPLER COMPLETE: CPT

## 2018-02-28 PROCEDURE — 93306 TTE W/DOPPLER COMPLETE: CPT | Mod: 26

## 2018-03-05 ENCOUNTER — APPOINTMENT (OUTPATIENT)
Dept: ORTHOPEDIC SURGERY | Facility: CLINIC | Age: 48
End: 2018-03-05
Payer: COMMERCIAL

## 2018-03-05 VITALS
HEART RATE: 74 BPM | HEIGHT: 72 IN | SYSTOLIC BLOOD PRESSURE: 142 MMHG | WEIGHT: 250 LBS | DIASTOLIC BLOOD PRESSURE: 90 MMHG | BODY MASS INDEX: 33.86 KG/M2

## 2018-03-05 DIAGNOSIS — M25.552 PAIN IN LEFT HIP: ICD-10-CM

## 2018-03-05 PROCEDURE — 99244 OFF/OP CNSLTJ NEW/EST MOD 40: CPT

## 2018-03-05 PROCEDURE — 73502 X-RAY EXAM HIP UNI 2-3 VIEWS: CPT | Mod: LT

## 2018-03-05 PROCEDURE — 72100 X-RAY EXAM L-S SPINE 2/3 VWS: CPT

## 2018-05-21 NOTE — ED ADULT NURSE NOTE - OBJECTIVE STATEMENT
Pt received into room 28 co Right sided hip and groin pain x 12 days. Pt A&Ox4, appears uncomfortable. Pt states pain is chronic, however earlier today he lifted an item into the car and felt a sharp "tearing" like pain in his groin and pain worsened after. Pt denies difficulty ambulating, weakness, cp, SOB. Pt has PMh DVT, PE: on coumadin. Md evaluated, VS a snorted. Medicated as ordered. 20 G IV inserted into L AC, labs sent. Family at bedside, will continue to monitor.

## 2018-05-31 ENCOUNTER — APPOINTMENT (OUTPATIENT)
Dept: ORTHOPEDIC SURGERY | Facility: CLINIC | Age: 48
End: 2018-05-31
Payer: COMMERCIAL

## 2018-05-31 VITALS
WEIGHT: 250 LBS | BODY MASS INDEX: 33.86 KG/M2 | HEIGHT: 72 IN | SYSTOLIC BLOOD PRESSURE: 125 MMHG | DIASTOLIC BLOOD PRESSURE: 88 MMHG | HEART RATE: 79 BPM

## 2018-05-31 PROCEDURE — 99213 OFFICE O/P EST LOW 20 MIN: CPT

## 2018-06-13 ENCOUNTER — APPOINTMENT (OUTPATIENT)
Dept: ORTHOPEDIC SURGERY | Facility: CLINIC | Age: 48
End: 2018-06-13
Payer: COMMERCIAL

## 2018-06-13 VITALS
HEIGHT: 60 IN | WEIGHT: 250 LBS | SYSTOLIC BLOOD PRESSURE: 131 MMHG | DIASTOLIC BLOOD PRESSURE: 78 MMHG | HEART RATE: 89 BPM | BODY MASS INDEX: 49.08 KG/M2

## 2018-06-13 DIAGNOSIS — M47.817 SPONDYLOSIS W/OUT MYELOPATHY OR RADICULOPATHY, LUMBOSACRAL REGION: ICD-10-CM

## 2018-06-13 PROCEDURE — 99214 OFFICE O/P EST MOD 30 MIN: CPT

## 2018-06-28 ENCOUNTER — APPOINTMENT (OUTPATIENT)
Dept: PULMONOLOGY | Facility: CLINIC | Age: 48
End: 2018-06-28
Payer: COMMERCIAL

## 2018-06-28 VITALS
OXYGEN SATURATION: 96 % | TEMPERATURE: 98.2 F | HEIGHT: 72 IN | RESPIRATION RATE: 12 BRPM | SYSTOLIC BLOOD PRESSURE: 119 MMHG | HEART RATE: 88 BPM | WEIGHT: 250 LBS | BODY MASS INDEX: 33.86 KG/M2 | DIASTOLIC BLOOD PRESSURE: 84 MMHG

## 2018-06-28 LAB
INR PPP: 3 RATIO
POCT-PROTHROMBIN TIME: 35.6 SECS

## 2018-06-28 PROCEDURE — 85610 PROTHROMBIN TIME: CPT | Mod: QW

## 2018-06-28 PROCEDURE — 99213 OFFICE O/P EST LOW 20 MIN: CPT | Mod: 25

## 2018-06-28 RX ORDER — WARFARIN 2.5 MG/1
2.5 TABLET ORAL
Qty: 30 | Refills: 0 | Status: DISCONTINUED | COMMUNITY
Start: 2018-02-16

## 2018-06-28 RX ORDER — AZITHROMYCIN 250 MG/1
250 TABLET, FILM COATED ORAL
Qty: 6 | Refills: 0 | Status: DISCONTINUED | COMMUNITY
Start: 2018-04-04

## 2018-06-29 ENCOUNTER — RECORD ABSTRACTING (OUTPATIENT)
Age: 48
End: 2018-06-29

## 2018-06-29 DIAGNOSIS — K76.89 OTHER SPECIFIED DISEASES OF LIVER: ICD-10-CM

## 2018-06-29 DIAGNOSIS — Z87.09 PERSONAL HISTORY OF OTHER DISEASES OF THE RESPIRATORY SYSTEM: ICD-10-CM

## 2018-07-11 ENCOUNTER — RX RENEWAL (OUTPATIENT)
Age: 48
End: 2018-07-11

## 2018-07-13 ENCOUNTER — APPOINTMENT (OUTPATIENT)
Dept: PULMONOLOGY | Facility: CLINIC | Age: 48
End: 2018-07-13

## 2018-07-13 ENCOUNTER — APPOINTMENT (OUTPATIENT)
Dept: PULMONOLOGY | Facility: CLINIC | Age: 48
End: 2018-07-13
Payer: COMMERCIAL

## 2018-07-13 VITALS
TEMPERATURE: 98.6 F | HEART RATE: 92 BPM | SYSTOLIC BLOOD PRESSURE: 113 MMHG | DIASTOLIC BLOOD PRESSURE: 79 MMHG | OXYGEN SATURATION: 96 %

## 2018-07-13 LAB
INR PPP: 2.5 RATIO
QUALITY CONTROL: YES

## 2018-07-13 PROCEDURE — 85610 PROTHROMBIN TIME: CPT | Mod: QW

## 2018-07-13 PROCEDURE — 99213 OFFICE O/P EST LOW 20 MIN: CPT | Mod: 25

## 2018-07-13 RX ORDER — OMEPRAZOLE 20 MG/1
20 CAPSULE, DELAYED RELEASE ORAL
Refills: 0 | Status: DISCONTINUED | COMMUNITY
End: 2018-07-13

## 2018-07-13 RX ORDER — OMEPRAZOLE MAGNESIUM 40 MG/1
40 CAPSULE, DELAYED RELEASE ORAL
Refills: 0 | Status: DISCONTINUED | COMMUNITY
End: 2018-07-13

## 2018-07-22 PROBLEM — M47.817 LUMBOSACRAL SPONDYLOSIS: Status: ACTIVE | Noted: 2018-06-13

## 2018-07-22 PROBLEM — Z78.9 ALCOHOL USE: Status: ACTIVE | Noted: 2017-10-26

## 2018-07-25 ENCOUNTER — APPOINTMENT (OUTPATIENT)
Dept: ORTHOPEDIC SURGERY | Facility: CLINIC | Age: 48
End: 2018-07-25
Payer: COMMERCIAL

## 2018-07-25 ENCOUNTER — RECORD ABSTRACTING (OUTPATIENT)
Age: 48
End: 2018-07-25

## 2018-07-25 DIAGNOSIS — M51.36 OTHER INTERVERTEBRAL DISC DEGENERATION, LUMBAR REGION: ICD-10-CM

## 2018-07-25 PROCEDURE — 99214 OFFICE O/P EST MOD 30 MIN: CPT

## 2018-08-08 ENCOUNTER — APPOINTMENT (OUTPATIENT)
Dept: PULMONOLOGY | Facility: CLINIC | Age: 48
End: 2018-08-08
Payer: COMMERCIAL

## 2018-08-08 VITALS
DIASTOLIC BLOOD PRESSURE: 94 MMHG | SYSTOLIC BLOOD PRESSURE: 133 MMHG | TEMPERATURE: 98 F | HEART RATE: 76 BPM | OXYGEN SATURATION: 96 %

## 2018-08-08 LAB — INR PPP: 3 RATIO

## 2018-08-08 PROCEDURE — 85610 PROTHROMBIN TIME: CPT | Mod: QW

## 2018-08-08 PROCEDURE — 99213 OFFICE O/P EST LOW 20 MIN: CPT | Mod: 25

## 2018-08-15 ENCOUNTER — RX RENEWAL (OUTPATIENT)
Age: 48
End: 2018-08-15

## 2018-08-26 ENCOUNTER — RX RENEWAL (OUTPATIENT)
Age: 48
End: 2018-08-26

## 2018-09-05 ENCOUNTER — APPOINTMENT (OUTPATIENT)
Dept: PULMONOLOGY | Facility: CLINIC | Age: 48
End: 2018-09-05
Payer: COMMERCIAL

## 2018-09-05 VITALS
TEMPERATURE: 97.9 F | BODY MASS INDEX: 33.86 KG/M2 | SYSTOLIC BLOOD PRESSURE: 125 MMHG | HEART RATE: 87 BPM | HEIGHT: 72 IN | DIASTOLIC BLOOD PRESSURE: 86 MMHG | OXYGEN SATURATION: 96 % | WEIGHT: 250 LBS | RESPIRATION RATE: 16 BRPM

## 2018-09-05 DIAGNOSIS — Z86.711 PERSONAL HISTORY OF PULMONARY EMBOLISM: ICD-10-CM

## 2018-09-05 PROCEDURE — 85610 PROTHROMBIN TIME: CPT | Mod: QW

## 2018-09-05 PROCEDURE — 99213 OFFICE O/P EST LOW 20 MIN: CPT | Mod: 25

## 2018-09-13 LAB — INR PPP: 2 RATIO

## 2018-09-26 ENCOUNTER — APPOINTMENT (OUTPATIENT)
Dept: PULMONOLOGY | Facility: CLINIC | Age: 48
End: 2018-09-26
Payer: COMMERCIAL

## 2018-09-26 VITALS
HEIGHT: 72 IN | BODY MASS INDEX: 33.86 KG/M2 | SYSTOLIC BLOOD PRESSURE: 121 MMHG | OXYGEN SATURATION: 97 % | HEART RATE: 76 BPM | DIASTOLIC BLOOD PRESSURE: 85 MMHG | WEIGHT: 250 LBS | TEMPERATURE: 97.9 F

## 2018-09-26 LAB — INR PPP: 2.8 RATIO

## 2018-09-26 PROCEDURE — G0008: CPT

## 2018-09-26 PROCEDURE — 85610 PROTHROMBIN TIME: CPT | Mod: QW

## 2018-09-26 PROCEDURE — 90686 IIV4 VACC NO PRSV 0.5 ML IM: CPT

## 2018-09-26 PROCEDURE — 99213 OFFICE O/P EST LOW 20 MIN: CPT | Mod: 25

## 2018-10-17 ENCOUNTER — APPOINTMENT (OUTPATIENT)
Dept: GASTROENTEROLOGY | Facility: CLINIC | Age: 48
End: 2018-10-17
Payer: COMMERCIAL

## 2018-10-17 VITALS
RESPIRATION RATE: 16 BRPM | TEMPERATURE: 97.8 F | WEIGHT: 260 LBS | DIASTOLIC BLOOD PRESSURE: 80 MMHG | OXYGEN SATURATION: 98 % | SYSTOLIC BLOOD PRESSURE: 116 MMHG | BODY MASS INDEX: 35.21 KG/M2 | HEART RATE: 95 BPM | HEIGHT: 72 IN

## 2018-10-17 DIAGNOSIS — Z78.9 OTHER SPECIFIED HEALTH STATUS: ICD-10-CM

## 2018-10-17 DIAGNOSIS — K30 FUNCTIONAL DYSPEPSIA: ICD-10-CM

## 2018-10-17 PROCEDURE — 99244 OFF/OP CNSLTJ NEW/EST MOD 40: CPT

## 2018-10-24 ENCOUNTER — APPOINTMENT (OUTPATIENT)
Dept: PULMONOLOGY | Facility: CLINIC | Age: 48
End: 2018-10-24

## 2018-11-01 ENCOUNTER — NON-APPOINTMENT (OUTPATIENT)
Age: 48
End: 2018-11-01

## 2018-11-01 ENCOUNTER — APPOINTMENT (OUTPATIENT)
Dept: PULMONOLOGY | Facility: CLINIC | Age: 48
End: 2018-11-01
Payer: COMMERCIAL

## 2018-11-01 VITALS
OXYGEN SATURATION: 95 % | SYSTOLIC BLOOD PRESSURE: 129 MMHG | HEART RATE: 82 BPM | DIASTOLIC BLOOD PRESSURE: 94 MMHG | RESPIRATION RATE: 16 BRPM

## 2018-11-01 LAB
ALBUMIN: 10
BILIRUB UR QL STRIP: NEGATIVE
CREATININE: <30
GLUCOSE UR-MCNC: NEGATIVE
HCG UR QL: 0.2 EU/DL
HGB UR QL STRIP.AUTO: NEGATIVE
INR PPP: 2.6 RATIO
KETONES UR-MCNC: NEGATIVE
LEUKOCYTE ESTERASE UR QL STRIP: NEGATIVE
MICROALBUMIN/CREAT UR TEST STR-RTO: 300
NITRITE UR QL STRIP: NEGATIVE
PH UR STRIP: 5
POCT - HEMOGLOBIN (HGB), QUANTITATIVE, TRANSCUTANEOUS: 15.2
PROT UR STRIP-MCNC: NEGATIVE
SP GR UR STRIP: 1.01

## 2018-11-01 PROCEDURE — 88738 HGB QUANT TRANSCUTANEOUS: CPT

## 2018-11-01 PROCEDURE — 94727 GAS DIL/WSHOT DETER LNG VOL: CPT

## 2018-11-01 PROCEDURE — 94010 BREATHING CAPACITY TEST: CPT

## 2018-11-01 PROCEDURE — 71046 X-RAY EXAM CHEST 2 VIEWS: CPT

## 2018-11-01 PROCEDURE — 36415 COLL VENOUS BLD VENIPUNCTURE: CPT

## 2018-11-01 PROCEDURE — 81003 URINALYSIS AUTO W/O SCOPE: CPT | Mod: QW

## 2018-11-01 PROCEDURE — 85610 PROTHROMBIN TIME: CPT | Mod: QW

## 2018-11-01 PROCEDURE — 82044 UR ALBUMIN SEMIQUANTITATIVE: CPT | Mod: QW

## 2018-11-01 PROCEDURE — 99214 OFFICE O/P EST MOD 30 MIN: CPT | Mod: 25

## 2018-11-01 PROCEDURE — 94729 DIFFUSING CAPACITY: CPT

## 2018-11-01 PROCEDURE — 93000 ELECTROCARDIOGRAM COMPLETE: CPT

## 2018-11-02 LAB
25(OH)D3 SERPL-MCNC: 26.2 NG/ML
ALBUMIN SERPL ELPH-MCNC: 4.6 G/DL
ALP BLD-CCNC: 41 U/L
ALT SERPL-CCNC: 21 U/L
ANION GAP SERPL CALC-SCNC: 13 MMOL/L
AST SERPL-CCNC: 20 U/L
BASOPHILS # BLD AUTO: 0.02 K/UL
BASOPHILS NFR BLD AUTO: 0.5 %
BILIRUB SERPL-MCNC: 0.5 MG/DL
BUN SERPL-MCNC: 16 MG/DL
CALCIUM SERPL-MCNC: 9.8 MG/DL
CHLORIDE SERPL-SCNC: 107 MMOL/L
CHOLEST SERPL-MCNC: 211 MG/DL
CHOLEST/HDLC SERPL: 3.5 RATIO
CO2 SERPL-SCNC: 24 MMOL/L
CREAT SERPL-MCNC: 0.9 MG/DL
EOSINOPHIL # BLD AUTO: 0.06 K/UL
EOSINOPHIL NFR BLD AUTO: 1.6 %
GLUCOSE SERPL-MCNC: 86 MG/DL
HBA1C MFR BLD HPLC: 5.6 %
HCT VFR BLD CALC: 43.6 %
HCV AB SER QL: NONREACTIVE
HCV S/CO RATIO: 0.14 S/CO
HDLC SERPL-MCNC: 61 MG/DL
HGB BLD-MCNC: 14.4 G/DL
IMM GRANULOCYTES NFR BLD AUTO: 0 %
LDLC SERPL CALC-MCNC: 130 MG/DL
LYMPHOCYTES # BLD AUTO: 1.7 K/UL
LYMPHOCYTES NFR BLD AUTO: 46.7 %
MAN DIFF?: NORMAL
MCHC RBC-ENTMCNC: 32.4 PG
MCHC RBC-ENTMCNC: 33 GM/DL
MCV RBC AUTO: 98 FL
MONOCYTES # BLD AUTO: 0.36 K/UL
MONOCYTES NFR BLD AUTO: 9.9 %
NEUTROPHILS # BLD AUTO: 1.5 K/UL
NEUTROPHILS NFR BLD AUTO: 41.3 %
PLATELET # BLD AUTO: 229 K/UL
POTASSIUM SERPL-SCNC: 4.6 MMOL/L
PROT SERPL-MCNC: 7.8 G/DL
PSA SERPL-MCNC: 0.94 NG/ML
RBC # BLD: 4.45 M/UL
RBC # FLD: 13.9 %
SODIUM SERPL-SCNC: 144 MMOL/L
T4 SERPL-MCNC: 8.7 UG/DL
TRIGL SERPL-MCNC: 101 MG/DL
TSH SERPL-ACNC: 2.1 UIU/ML
WBC # FLD AUTO: 3.64 K/UL

## 2018-11-05 ENCOUNTER — RX RENEWAL (OUTPATIENT)
Age: 48
End: 2018-11-05

## 2018-11-05 LAB — HEMOCCULT STL QL IA: NEGATIVE

## 2018-11-08 ENCOUNTER — APPOINTMENT (OUTPATIENT)
Dept: GASTROENTEROLOGY | Facility: AMBULATORY MEDICAL SERVICES | Age: 48
End: 2018-11-08
Payer: COMMERCIAL

## 2018-11-08 PROCEDURE — 45380 COLONOSCOPY AND BIOPSY: CPT | Mod: 33,59

## 2018-11-08 PROCEDURE — 43239 EGD BIOPSY SINGLE/MULTIPLE: CPT | Mod: 59

## 2018-11-08 PROCEDURE — 45385 COLONOSCOPY W/LESION REMOVAL: CPT | Mod: 33

## 2018-11-16 ENCOUNTER — APPOINTMENT (OUTPATIENT)
Dept: PULMONOLOGY | Facility: CLINIC | Age: 48
End: 2018-11-16
Payer: COMMERCIAL

## 2018-11-16 VITALS
OXYGEN SATURATION: 96 % | SYSTOLIC BLOOD PRESSURE: 189 MMHG | DIASTOLIC BLOOD PRESSURE: 72 MMHG | HEART RATE: 84 BPM | RESPIRATION RATE: 18 BRPM | TEMPERATURE: 98.7 F

## 2018-11-16 LAB — INR PPP: 1.7 RATIO

## 2018-11-16 PROCEDURE — 85610 PROTHROMBIN TIME: CPT | Mod: QW

## 2018-11-16 PROCEDURE — 99213 OFFICE O/P EST LOW 20 MIN: CPT | Mod: 25

## 2018-12-03 ENCOUNTER — APPOINTMENT (OUTPATIENT)
Dept: GASTROENTEROLOGY | Facility: CLINIC | Age: 48
End: 2018-12-03
Payer: COMMERCIAL

## 2018-12-03 VITALS
BODY MASS INDEX: 34.4 KG/M2 | HEIGHT: 72 IN | TEMPERATURE: 98.1 F | HEART RATE: 78 BPM | WEIGHT: 254 LBS | SYSTOLIC BLOOD PRESSURE: 110 MMHG | OXYGEN SATURATION: 98 % | DIASTOLIC BLOOD PRESSURE: 70 MMHG

## 2018-12-03 DIAGNOSIS — K63.5 POLYP OF COLON: ICD-10-CM

## 2018-12-03 PROCEDURE — 99213 OFFICE O/P EST LOW 20 MIN: CPT

## 2018-12-12 ENCOUNTER — APPOINTMENT (OUTPATIENT)
Dept: PULMONOLOGY | Facility: CLINIC | Age: 48
End: 2018-12-12
Payer: COMMERCIAL

## 2018-12-12 VITALS
TEMPERATURE: 97.9 F | WEIGHT: 250 LBS | BODY MASS INDEX: 33.86 KG/M2 | HEIGHT: 72 IN | HEART RATE: 83 BPM | DIASTOLIC BLOOD PRESSURE: 87 MMHG | SYSTOLIC BLOOD PRESSURE: 131 MMHG | OXYGEN SATURATION: 100 % | RESPIRATION RATE: 16 BRPM

## 2018-12-12 LAB
INR PPP: 2.1 RATIO
QUALITY CONTROL: YES

## 2018-12-12 PROCEDURE — 85610 PROTHROMBIN TIME: CPT | Mod: QW

## 2018-12-12 PROCEDURE — 99213 OFFICE O/P EST LOW 20 MIN: CPT | Mod: 25

## 2018-12-12 RX ORDER — POLYETHYLENE GLYCOL 3350, SODIUM CHLORIDE, SODIUM BICARBONATE AND POTASSIUM CHLORIDE WITH LEMON FLAVOR 420; 11.2; 5.72; 1.48 G/4L; G/4L; G/4L; G/4L
420 POWDER, FOR SOLUTION ORAL
Qty: 4000 | Refills: 0 | Status: DISCONTINUED | COMMUNITY
Start: 2018-10-30

## 2019-01-09 ENCOUNTER — APPOINTMENT (OUTPATIENT)
Dept: PULMONOLOGY | Facility: CLINIC | Age: 49
End: 2019-01-09
Payer: COMMERCIAL

## 2019-01-09 VITALS
OXYGEN SATURATION: 96 % | RESPIRATION RATE: 16 BRPM | HEART RATE: 86 BPM | SYSTOLIC BLOOD PRESSURE: 124 MMHG | DIASTOLIC BLOOD PRESSURE: 89 MMHG

## 2019-01-09 LAB
INR PPP: ABNORMAL RATIO
QUALITY CONTROL: YES

## 2019-01-09 PROCEDURE — 99213 OFFICE O/P EST LOW 20 MIN: CPT | Mod: 25

## 2019-01-09 PROCEDURE — 85610 PROTHROMBIN TIME: CPT | Mod: QW

## 2019-01-09 NOTE — PROCEDURE
[FreeTextEntry1] : INR 2.1\par \par EKG NSR Nov 1 2018\par \par PFT No BD Nov 16 2018\par Flow rates normal\par Lung volumes normal\par Mild reduction diffusion 67% predicted\par Hemoglobin 15.2\par \par CXR Pa lateral Nov 1 2018\par Cardiac size normal\par Lung fields clear\par No pleural effusions dominant pulmonary nodules pneumothorax\par Mediastinum hilum normal

## 2019-01-09 NOTE — PHYSICAL EXAM
[General Appearance - Well Developed] : well developed [General Appearance - Well Nourished] : well nourished [Normal Conjunctiva] : the conjunctiva exhibited no abnormalities [Eyelids - No Xanthelasma] : the eyelids demonstrated no xanthelasmas [Normal Oropharynx] : normal oropharynx [Neck Appearance] : the appearance of the neck was normal [Neck Cervical Mass (___cm)] : no neck mass was observed [Jugular Venous Distention Increased] : there was no jugular-venous distention [Thyroid Diffuse Enlargement] : the thyroid was not enlarged [Heart Rate And Rhythm] : heart rate and rhythm were normal [Heart Sounds] : normal S1 and S2 [Arterial Pulses Normal] : the arterial pulses were normal [Respiration, Rhythm And Depth] : normal respiratory rhythm and effort [Exaggerated Use Of Accessory Muscles For Inspiration] : no accessory muscle use [Auscultation Breath Sounds / Voice Sounds] : lungs were clear to auscultation bilaterally [Bowel Sounds] : normal bowel sounds [Abdomen Soft] : soft [Abdomen Tenderness] : non-tender [Abdomen Mass (___ Cm)] : no abdominal mass palpated [Abnormal Walk] : normal gait [Nail Clubbing] : no clubbing of the fingernails [Cyanosis, Localized] : no localized cyanosis [Petechial Hemorrhages (___cm)] : no petechial hemorrhages [Skin Color & Pigmentation] : normal skin color and pigmentation [] : no rash [No Venous Stasis] : no venous stasis [Skin Lesions] : no skin lesions [No Skin Ulcers] : no skin ulcer [No Xanthoma] : no  xanthoma was observed [Deep Tendon Reflexes (DTR)] : deep tendon reflexes were 2+ and symmetric [Sensation] : the sensory exam was normal to light touch and pinprick [No Focal Deficits] : no focal deficits [Oriented To Time, Place, And Person] : oriented to person, place, and time [Impaired Insight] : insight and judgment were intact [Affect] : the affect was normal

## 2019-01-09 NOTE — DISCUSSION/SUMMARY
[FreeTextEntry1] : recurrent PE\par long term coumadin\par 7.5 mg x 2 nites- CHANGE 1 night\par  and other nites 5 mg CHANGE 6 nights\par  has declined change to xarelto\par \par Labs reviewed\par  and watch Lipid Profile and WBC\par Cleared for procedure

## 2019-01-09 NOTE — HISTORY OF PRESENT ILLNESS
[Stable] : are stable [Difficulty Breathing During Exertion] : denies dyspnea on exertion [Feelings Of Weakness On Exertion] : denies exercise intolerance [Cough] : denies coughing [Wheezing] : denies wheezing [Regional Soft Tissue Swelling Both Lower Extremities] : denies lower extremity edema [Chest Pain Or Discomfort] : denies chest pain [Fever] : denies fever [Obstructive Sleep Apnea] : obstructive sleep apnea [Date: ___] : Date of most recent diagnostic polysomnogram: [unfilled] [FreeTextEntry1] : Mild PACO   Not on active treatment

## 2019-01-13 ENCOUNTER — RX RENEWAL (OUTPATIENT)
Age: 49
End: 2019-01-13

## 2019-02-12 ENCOUNTER — RX RENEWAL (OUTPATIENT)
Age: 49
End: 2019-02-12

## 2019-02-13 ENCOUNTER — APPOINTMENT (OUTPATIENT)
Dept: PULMONOLOGY | Facility: CLINIC | Age: 49
End: 2019-02-13
Payer: COMMERCIAL

## 2019-02-13 VITALS
DIASTOLIC BLOOD PRESSURE: 84 MMHG | OXYGEN SATURATION: 96 % | HEART RATE: 69 BPM | SYSTOLIC BLOOD PRESSURE: 123 MMHG | RESPIRATION RATE: 16 BRPM

## 2019-02-13 LAB
INR PPP: 2 RATIO
QUALITY CONTROL: YES

## 2019-02-13 PROCEDURE — 85610 PROTHROMBIN TIME: CPT | Mod: QW

## 2019-02-13 PROCEDURE — 99213 OFFICE O/P EST LOW 20 MIN: CPT | Mod: 25

## 2019-03-07 NOTE — PROCEDURE
[FreeTextEntry1] : INR 2.0\par \par EKG NSR Nov 1 2018\par \par PFT No BD Nov 16 2018\par Flow rates normal\par Lung volumes normal\par Mild reduction diffusion 67% predicted\par Hemoglobin 15.2\par \par CXR Pa lateral Nov 1 2018\par Cardiac size normal\par Lung fields clear\par No pleural effusions dominant pulmonary nodules pneumothorax\par Mediastinum hilum normal

## 2019-03-07 NOTE — DISCUSSION/SUMMARY
[FreeTextEntry1] : recurrent PE\par long term coumadin\par 7.5 mg x 1 night\par  and other nites 5 mg  6 nights\par  has declined change to xarelto\par \par Cardiology evaluation review March 7, 2019\par Recommendations of cardiology noted including echocardiogram possible cardiac catheterization CT calcium score\par Borderline hypertension\par Recheck cholesterol\par As noted he did decline use of novel anticoagulation therapy\par Weight loss\par Readdress issue of obstructive sleep apnea-he did complete a sleep study January 20 and 21, 2016 with an overall AHI of 5\par

## 2019-03-14 ENCOUNTER — RX RENEWAL (OUTPATIENT)
Age: 49
End: 2019-03-14

## 2019-03-20 ENCOUNTER — APPOINTMENT (OUTPATIENT)
Dept: PULMONOLOGY | Facility: CLINIC | Age: 49
End: 2019-03-20
Payer: COMMERCIAL

## 2019-03-20 VITALS
OXYGEN SATURATION: 98 % | RESPIRATION RATE: 16 BRPM | HEART RATE: 76 BPM | SYSTOLIC BLOOD PRESSURE: 127 MMHG | DIASTOLIC BLOOD PRESSURE: 87 MMHG | WEIGHT: 250 LBS | BODY MASS INDEX: 33.86 KG/M2 | HEIGHT: 72 IN

## 2019-03-20 LAB — INR PPP: 2.2 RATIO

## 2019-03-20 PROCEDURE — 85610 PROTHROMBIN TIME: CPT | Mod: QW

## 2019-03-20 PROCEDURE — 99213 OFFICE O/P EST LOW 20 MIN: CPT | Mod: 25

## 2019-03-20 NOTE — PROCEDURE
[FreeTextEntry1] : INR 2.2\par \par EKG NSR Nov 1 2018\par \par PFT No BD Nov 16 2018\par Flow rates normal\par Lung volumes normal\par Mild reduction diffusion 67% predicted\par Hemoglobin 15.2\par \par CXR Pa lateral Nov 1 2018\par Cardiac size normal\par Lung fields clear\par No pleural effusions dominant pulmonary nodules pneumothorax\par Mediastinum hilum normal

## 2019-03-20 NOTE — PHYSICAL EXAM
[General Appearance - Well Developed] : well developed [General Appearance - Well Nourished] : well nourished [Normal Conjunctiva] : the conjunctiva exhibited no abnormalities [Eyelids - No Xanthelasma] : the eyelids demonstrated no xanthelasmas [Normal Oropharynx] : normal oropharynx [Neck Appearance] : the appearance of the neck was normal [Neck Cervical Mass (___cm)] : no neck mass was observed [Jugular Venous Distention Increased] : there was no jugular-venous distention [Thyroid Diffuse Enlargement] : the thyroid was not enlarged [Heart Rate And Rhythm] : heart rate and rhythm were normal [Heart Sounds] : normal S1 and S2 [Arterial Pulses Normal] : the arterial pulses were normal [Respiration, Rhythm And Depth] : normal respiratory rhythm and effort [Exaggerated Use Of Accessory Muscles For Inspiration] : no accessory muscle use [Auscultation Breath Sounds / Voice Sounds] : lungs were clear to auscultation bilaterally [Bowel Sounds] : normal bowel sounds [Abdomen Soft] : soft [Abdomen Tenderness] : non-tender [Abdomen Mass (___ Cm)] : no abdominal mass palpated [Abnormal Walk] : normal gait [Nail Clubbing] : no clubbing of the fingernails [Cyanosis, Localized] : no localized cyanosis [Petechial Hemorrhages (___cm)] : no petechial hemorrhages [Skin Color & Pigmentation] : normal skin color and pigmentation [] : no rash [No Venous Stasis] : no venous stasis [No Skin Ulcers] : no skin ulcer [Skin Lesions] : no skin lesions [No Xanthoma] : no  xanthoma was observed [Deep Tendon Reflexes (DTR)] : deep tendon reflexes were 2+ and symmetric [Sensation] : the sensory exam was normal to light touch and pinprick [No Focal Deficits] : no focal deficits [Oriented To Time, Place, And Person] : oriented to person, place, and time [Impaired Insight] : insight and judgment were intact [Affect] : the affect was normal

## 2019-04-14 ENCOUNTER — RX RENEWAL (OUTPATIENT)
Age: 49
End: 2019-04-14

## 2019-04-17 ENCOUNTER — APPOINTMENT (OUTPATIENT)
Dept: PULMONOLOGY | Facility: CLINIC | Age: 49
End: 2019-04-17
Payer: COMMERCIAL

## 2019-04-17 VITALS
HEART RATE: 82 BPM | SYSTOLIC BLOOD PRESSURE: 127 MMHG | DIASTOLIC BLOOD PRESSURE: 88 MMHG | TEMPERATURE: 99.4 F | OXYGEN SATURATION: 96 % | RESPIRATION RATE: 12 BRPM

## 2019-04-17 DIAGNOSIS — I82.402 ACUTE EMBOLISM AND THROMBOSIS OF UNSPECIFIED DEEP VEINS OF LEFT LOWER EXTREMITY: ICD-10-CM

## 2019-04-17 LAB — INR PPP: 2.4 RATIO

## 2019-04-17 PROCEDURE — 99213 OFFICE O/P EST LOW 20 MIN: CPT

## 2019-04-17 PROCEDURE — 85610 PROTHROMBIN TIME: CPT | Mod: QW

## 2019-04-17 NOTE — PHYSICAL EXAM
[General Appearance - Well Developed] : well developed [General Appearance - Well Nourished] : well nourished [Normal Oropharynx] : normal oropharynx [Eyelids - No Xanthelasma] : the eyelids demonstrated no xanthelasmas [Normal Conjunctiva] : the conjunctiva exhibited no abnormalities [Neck Appearance] : the appearance of the neck was normal [Jugular Venous Distention Increased] : there was no jugular-venous distention [Neck Cervical Mass (___cm)] : no neck mass was observed [Thyroid Diffuse Enlargement] : the thyroid was not enlarged [Arterial Pulses Normal] : the arterial pulses were normal [Heart Sounds] : normal S1 and S2 [Heart Rate And Rhythm] : heart rate and rhythm were normal [Respiration, Rhythm And Depth] : normal respiratory rhythm and effort [Exaggerated Use Of Accessory Muscles For Inspiration] : no accessory muscle use [Auscultation Breath Sounds / Voice Sounds] : lungs were clear to auscultation bilaterally [Bowel Sounds] : normal bowel sounds [Abdomen Soft] : soft [Abdomen Tenderness] : non-tender [Abdomen Mass (___ Cm)] : no abdominal mass palpated [Abnormal Walk] : normal gait [Nail Clubbing] : no clubbing of the fingernails [Petechial Hemorrhages (___cm)] : no petechial hemorrhages [Cyanosis, Localized] : no localized cyanosis [] : no rash [Skin Color & Pigmentation] : normal skin color and pigmentation [No Venous Stasis] : no venous stasis [Skin Lesions] : no skin lesions [No Xanthoma] : no  xanthoma was observed [No Skin Ulcers] : no skin ulcer [Sensation] : the sensory exam was normal to light touch and pinprick [Deep Tendon Reflexes (DTR)] : deep tendon reflexes were 2+ and symmetric [No Focal Deficits] : no focal deficits [Oriented To Time, Place, And Person] : oriented to person, place, and time [Impaired Insight] : insight and judgment were intact [Affect] : the affect was normal

## 2019-05-09 ENCOUNTER — APPOINTMENT (OUTPATIENT)
Dept: PULMONOLOGY | Facility: CLINIC | Age: 49
End: 2019-05-09
Payer: COMMERCIAL

## 2019-05-09 VITALS
OXYGEN SATURATION: 97 % | HEART RATE: 67 BPM | TEMPERATURE: 97.5 F | SYSTOLIC BLOOD PRESSURE: 133 MMHG | DIASTOLIC BLOOD PRESSURE: 89 MMHG

## 2019-05-09 DIAGNOSIS — A08.4 VIRAL INTESTINAL INFECTION, UNSPECIFIED: ICD-10-CM

## 2019-05-09 LAB
BILIRUB UR QL STRIP: NEGATIVE
CLARITY UR: NORMAL
COLLECTION METHOD: NORMAL
GLUCOSE UR-MCNC: NEGATIVE
HCG UR QL: 0.2 EU/DL
HGB UR QL STRIP.AUTO: NORMAL
INR PPP: 2 RATIO
KETONES UR-MCNC: NEGATIVE
LEUKOCYTE ESTERASE UR QL STRIP: NEGATIVE
NITRITE UR QL STRIP: NEGATIVE
PH UR STRIP: 5.5
PROT UR STRIP-MCNC: NEGATIVE
QUALITY CONTROL: YES
SP GR UR STRIP: 1.01

## 2019-05-09 PROCEDURE — 99214 OFFICE O/P EST MOD 30 MIN: CPT | Mod: 25

## 2019-05-09 PROCEDURE — 36415 COLL VENOUS BLD VENIPUNCTURE: CPT

## 2019-05-09 PROCEDURE — 85610 PROTHROMBIN TIME: CPT | Mod: QW

## 2019-05-09 PROCEDURE — 81003 URINALYSIS AUTO W/O SCOPE: CPT | Mod: QW

## 2019-05-09 NOTE — PHYSICAL EXAM
[General Appearance - Well Developed] : well developed [Normal Conjunctiva] : the conjunctiva exhibited no abnormalities [Eyelids - No Xanthelasma] : the eyelids demonstrated no xanthelasmas [General Appearance - Well Nourished] : well nourished [Neck Cervical Mass (___cm)] : no neck mass was observed [Neck Appearance] : the appearance of the neck was normal [Normal Oropharynx] : normal oropharynx [Thyroid Diffuse Enlargement] : the thyroid was not enlarged [Jugular Venous Distention Increased] : there was no jugular-venous distention [Heart Rate And Rhythm] : heart rate and rhythm were normal [Arterial Pulses Normal] : the arterial pulses were normal [Heart Sounds] : normal S1 and S2 [Exaggerated Use Of Accessory Muscles For Inspiration] : no accessory muscle use [Auscultation Breath Sounds / Voice Sounds] : lungs were clear to auscultation bilaterally [Respiration, Rhythm And Depth] : normal respiratory rhythm and effort [Abdomen Tenderness] : non-tender [Abdomen Soft] : soft [Bowel Sounds] : normal bowel sounds [Abnormal Walk] : normal gait [Abdomen Mass (___ Cm)] : no abdominal mass palpated [Petechial Hemorrhages (___cm)] : no petechial hemorrhages [Cyanosis, Localized] : no localized cyanosis [Nail Clubbing] : no clubbing of the fingernails [] : no ischemic changes [Skin Color & Pigmentation] : normal skin color and pigmentation [No Venous Stasis] : no venous stasis [Skin Lesions] : no skin lesions [No Skin Ulcers] : no skin ulcer [No Xanthoma] : no  xanthoma was observed [No Focal Deficits] : no focal deficits [Sensation] : the sensory exam was normal to light touch and pinprick [Deep Tendon Reflexes (DTR)] : deep tendon reflexes were 2+ and symmetric [Impaired Insight] : insight and judgment were intact [Affect] : the affect was normal [Oriented To Time, Place, And Person] : oriented to person, place, and time

## 2019-05-10 LAB
ALBUMIN SERPL ELPH-MCNC: 4.4 G/DL
ALP BLD-CCNC: 44 U/L
ALT SERPL-CCNC: 24 U/L
AMYLASE/CREAT SERPL: 34 U/L
ANION GAP SERPL CALC-SCNC: 11 MMOL/L
AST SERPL-CCNC: 23 U/L
BASOPHILS # BLD AUTO: 0.01 K/UL
BASOPHILS NFR BLD AUTO: 0.2 %
BILIRUB SERPL-MCNC: 0.4 MG/DL
BUN SERPL-MCNC: 13 MG/DL
CALCIUM SERPL-MCNC: 9.1 MG/DL
CHLORIDE SERPL-SCNC: 102 MMOL/L
CO2 SERPL-SCNC: 26 MMOL/L
CREAT SERPL-MCNC: 1.05 MG/DL
EOSINOPHIL # BLD AUTO: 0.06 K/UL
EOSINOPHIL NFR BLD AUTO: 1.5 %
GLUCOSE SERPL-MCNC: 82 MG/DL
HCT VFR BLD CALC: 45.9 %
HGB BLD-MCNC: 14.9 G/DL
IMM GRANULOCYTES NFR BLD AUTO: 0.2 %
LPL SERPL-CCNC: 22 U/L
LYMPHOCYTES # BLD AUTO: 1.87 K/UL
LYMPHOCYTES NFR BLD AUTO: 45.7 %
MAN DIFF?: NORMAL
MCHC RBC-ENTMCNC: 32.5 GM/DL
MCHC RBC-ENTMCNC: 32.9 PG
MCV RBC AUTO: 101.3 FL
MONOCYTES # BLD AUTO: 0.56 K/UL
MONOCYTES NFR BLD AUTO: 13.7 %
NEUTROPHILS # BLD AUTO: 1.58 K/UL
NEUTROPHILS NFR BLD AUTO: 38.7 %
PLATELET # BLD AUTO: 235 K/UL
POTASSIUM SERPL-SCNC: 4.3 MMOL/L
PROT SERPL-MCNC: 7.6 G/DL
RBC # BLD: 4.53 M/UL
RBC # FLD: 12.2 %
SODIUM SERPL-SCNC: 139 MMOL/L
WBC # FLD AUTO: 4.09 K/UL

## 2019-05-10 NOTE — PROCEDURE
[FreeTextEntry1] : INR 2.1\par Urine negative\par Blood draw\par Reviewed 5/10/2019\par CBC White blood count 4.09 hemoglobin 14.9 hematocrit 45.9\par MCV 1031.3\par Platelet count 235,000\par Serum electrolytes normal\par Liver function testing normal\par BUN 13 creatinine 1.05\par Serum amylase and serum lipase both normal range

## 2019-05-10 NOTE — HISTORY OF PRESENT ILLNESS
[FreeTextEntry1] : Complaint states onset past Sunday developed GI symptomatology with abdominal cramping diarrhea\par No reported nausea\par No reported fever\par No's sweats or chills\par No urinary symptomatology\par No localized abdominal pain noted\par Denies any burping indigestion or reflux\par No recent travel\par He felt more likely was related to foods that he may have eaten\par

## 2019-05-10 NOTE — DISCUSSION/SUMMARY
[FreeTextEntry1] : viral gastroenteritis\par recurrent PE\par long term coumadin\par 7.5 mg x 1 night\par  and other nites 5 mg  6 nights\par  has declined change to xarelto\par Xifixan 200 mg tid X 3 days\par  if no improvement Stool cultures  \par \par Cardiology evaluation review March 7, 2019\par Recommendations of cardiology noted including echocardiogram possible cardiac catheterization CT calcium score\par Borderline hypertension\par Recheck cholesterol\par As noted he did decline use of novel anticoagulation therapy\par Weight loss\par Readdress issue of obstructive sleep apnea-he did complete a sleep study January 20 and 21, 2016 with an overall AHI of 5\par

## 2019-06-19 ENCOUNTER — APPOINTMENT (OUTPATIENT)
Dept: GASTROENTEROLOGY | Facility: CLINIC | Age: 49
End: 2019-06-19
Payer: COMMERCIAL

## 2019-06-19 ENCOUNTER — APPOINTMENT (OUTPATIENT)
Dept: PULMONOLOGY | Facility: CLINIC | Age: 49
End: 2019-06-19
Payer: COMMERCIAL

## 2019-06-19 VITALS
DIASTOLIC BLOOD PRESSURE: 80 MMHG | HEIGHT: 72 IN | WEIGHT: 255 LBS | OXYGEN SATURATION: 99 % | TEMPERATURE: 98.6 F | HEART RATE: 68 BPM | SYSTOLIC BLOOD PRESSURE: 140 MMHG | BODY MASS INDEX: 34.54 KG/M2

## 2019-06-19 VITALS
RESPIRATION RATE: 12 BRPM | HEART RATE: 73 BPM | OXYGEN SATURATION: 98 % | DIASTOLIC BLOOD PRESSURE: 89 MMHG | SYSTOLIC BLOOD PRESSURE: 135 MMHG

## 2019-06-19 LAB — INR PPP: 2.1 RATIO

## 2019-06-19 PROCEDURE — 99213 OFFICE O/P EST LOW 20 MIN: CPT | Mod: 25

## 2019-06-19 PROCEDURE — 99213 OFFICE O/P EST LOW 20 MIN: CPT

## 2019-06-19 PROCEDURE — 85610 PROTHROMBIN TIME: CPT | Mod: QW

## 2019-06-19 RX ORDER — WARFARIN 2.5 MG/1
2.5 TABLET ORAL DAILY
Qty: 100 | Refills: 0 | Status: DISCONTINUED | COMMUNITY
Start: 2018-08-26 | End: 2019-06-19

## 2019-06-19 NOTE — PHYSICAL EXAM
[General Appearance - Well Nourished] : well nourished [General Appearance - Well Developed] : well developed [Normal Conjunctiva] : the conjunctiva exhibited no abnormalities [Neck Appearance] : the appearance of the neck was normal [Normal Oropharynx] : normal oropharynx [Eyelids - No Xanthelasma] : the eyelids demonstrated no xanthelasmas [Neck Cervical Mass (___cm)] : no neck mass was observed [Jugular Venous Distention Increased] : there was no jugular-venous distention [Thyroid Diffuse Enlargement] : the thyroid was not enlarged [Heart Sounds] : normal S1 and S2 [Heart Rate And Rhythm] : heart rate and rhythm were normal [Arterial Pulses Normal] : the arterial pulses were normal [Exaggerated Use Of Accessory Muscles For Inspiration] : no accessory muscle use [Respiration, Rhythm And Depth] : normal respiratory rhythm and effort [Auscultation Breath Sounds / Voice Sounds] : lungs were clear to auscultation bilaterally [Bowel Sounds] : normal bowel sounds [Abdomen Soft] : soft [Abdomen Mass (___ Cm)] : no abdominal mass palpated [Abdomen Tenderness] : non-tender [Nail Clubbing] : no clubbing of the fingernails [Abnormal Walk] : normal gait [Cyanosis, Localized] : no localized cyanosis [Petechial Hemorrhages (___cm)] : no petechial hemorrhages [] : no rash [Skin Color & Pigmentation] : normal skin color and pigmentation [Skin Lesions] : no skin lesions [No Venous Stasis] : no venous stasis [No Skin Ulcers] : no skin ulcer [Sensation] : the sensory exam was normal to light touch and pinprick [No Xanthoma] : no  xanthoma was observed [Deep Tendon Reflexes (DTR)] : deep tendon reflexes were 2+ and symmetric [No Focal Deficits] : no focal deficits [Oriented To Time, Place, And Person] : oriented to person, place, and time [Affect] : the affect was normal [Impaired Insight] : insight and judgment were intact

## 2019-06-19 NOTE — HISTORY OF PRESENT ILLNESS
[FreeTextEntry1] : Patient no longer has upper gastrointestinal symptoms or atypical chest pain. He continues to take omeprazole 40 mg daily. He has altered his diet. He relates caffeine and sodas.\par Patient had 2 polyps removed from his colon on his recent colonoscopy. These were benign tubular adenomas

## 2019-06-19 NOTE — HISTORY OF PRESENT ILLNESS
[Stable] : are stable [Difficulty Breathing During Exertion] : denies dyspnea on exertion [Cough] : denies coughing [Feelings Of Weakness On Exertion] : denies exercise intolerance [Chest Pain Or Discomfort] : denies chest pain [Regional Soft Tissue Swelling Both Lower Extremities] : denies lower extremity edema [Wheezing] : denies wheezing [Fever] : denies fever [Obstructive Sleep Apnea] : obstructive sleep apnea [Date: ___] : Date of most recent diagnostic polysomnogram: [unfilled] [FreeTextEntry1] : Complaint states onset past Sunday developed GI symptomatology with abdominal cramping diarrhea\par No reported nausea\par No reported fever\par No's sweats or chills\par No urinary symptomatology\par No localized abdominal pain noted\par Denies any burping indigestion or reflux\par No recent travel\par He felt more likely was related to foods that he may have eaten\par

## 2019-06-19 NOTE — REASON FOR VISIT
[Follow-Up] : a follow-up visit [Pulmonary Embolism] : pulmonary embolism [FreeTextEntry2] : INR Recheck

## 2019-06-19 NOTE — DISCUSSION/SUMMARY
[FreeTextEntry1] : \par recurrent PE\par long term coumadin\par 7.5 mg x 1 night\par  and other nites 5 mg  6 nights\par  has declined change to xarelto\par Xifixan 200 mg tid X 3 days\par \par Cardiology evaluation review March 7, 2019\par Recommendations of cardiology noted including echocardiogram possible cardiac catheterization CT calcium score- Reports good [place] score\par Borderline hypertension\par Recheck cholesterol\par As noted he did decline use of novel anticoagulation therapy\par Weight loss\par Readdress issue of obstructive sleep apnea-he did complete a sleep study January 20 and 21, 2016 with an overall AHI of 5\par Nov 2019 [facility]

## 2019-06-19 NOTE — ASSESSMENT
[FreeTextEntry1] : Patient with no further atypical chest pain due to esophageal spasm. He will try to reduce his PPI to every other day

## 2019-07-16 ENCOUNTER — RX RENEWAL (OUTPATIENT)
Age: 49
End: 2019-07-16

## 2019-07-24 ENCOUNTER — APPOINTMENT (OUTPATIENT)
Dept: PULMONOLOGY | Facility: CLINIC | Age: 49
End: 2019-07-24
Payer: COMMERCIAL

## 2019-07-24 VITALS — DIASTOLIC BLOOD PRESSURE: 85 MMHG | OXYGEN SATURATION: 97 % | HEART RATE: 85 BPM | SYSTOLIC BLOOD PRESSURE: 126 MMHG

## 2019-07-24 DIAGNOSIS — J06.9 ACUTE UPPER RESPIRATORY INFECTION, UNSPECIFIED: ICD-10-CM

## 2019-07-24 LAB
INR PPP: 1.7 RATIO
QUALITY CONTROL: YES

## 2019-07-24 PROCEDURE — 85610 PROTHROMBIN TIME: CPT | Mod: QW

## 2019-07-24 PROCEDURE — 99213 OFFICE O/P EST LOW 20 MIN: CPT | Mod: 25

## 2019-07-24 NOTE — REASON FOR VISIT
[Follow-Up] : a follow-up visit [Cough] : cough [Pulmonary Embolism] : pulmonary embolism [FreeTextEntry2] : Long term AC

## 2019-07-24 NOTE — DISCUSSION/SUMMARY
[FreeTextEntry1] : mild post viral bronchitis -URI\par recurrent PE\par long term coumadin\par 7.5 mg x 1 night\par  and other nites 5 mg  6 nights\par  has declined change to xarelto\par Just for 2 days add additional 2.5 mg\par \par Cardiology evaluation review March 7, 2019\par Recommendations of cardiology noted including echocardiogram possible cardiac catheterization CT calcium score\par Borderline hypertension\par Recheck cholesterol\par As noted he did decline use of novel anticoagulation therapy\par Weight loss\par Readdress issue of obstructive sleep apnea-he did complete a sleep study January 20 and 21, 2016 with an overall AHI of 5\par

## 2019-07-24 NOTE — HISTORY OF PRESENT ILLNESS
[Stable] : are stable [Difficulty Breathing During Exertion] : denies dyspnea on exertion [Feelings Of Weakness On Exertion] : denies exercise intolerance [Cough] : denies coughing [Wheezing] : denies wheezing [Regional Soft Tissue Swelling Both Lower Extremities] : denies lower extremity edema [Chest Pain Or Discomfort] : denies chest pain [Fever] : denies fever [Obstructive Sleep Apnea] : obstructive sleep apnea [Date: ___] : Date of most recent diagnostic polysomnogram: [unfilled] [FreeTextEntry1] : Complaint states onset past Sunday developed GI symptomatology with abdominal cramping diarrhea\par No reported nausea\par No reported fever\par No's sweats or chills\par No urinary symptomatology\par No localized abdominal pain noted\par Denies any burping indigestion or reflux\par No recent travel\par He felt more likely was related to foods that he may have eaten\par

## 2019-07-24 NOTE — PHYSICAL EXAM
[General Appearance - Well Developed] : well developed [Normal Conjunctiva] : the conjunctiva exhibited no abnormalities [General Appearance - Well Nourished] : well nourished [Eyelids - No Xanthelasma] : the eyelids demonstrated no xanthelasmas [Normal Oropharynx] : normal oropharynx [Neck Appearance] : the appearance of the neck was normal [Jugular Venous Distention Increased] : there was no jugular-venous distention [Neck Cervical Mass (___cm)] : no neck mass was observed [Thyroid Diffuse Enlargement] : the thyroid was not enlarged [Heart Rate And Rhythm] : heart rate and rhythm were normal [Heart Sounds] : normal S1 and S2 [Arterial Pulses Normal] : the arterial pulses were normal [Respiration, Rhythm And Depth] : normal respiratory rhythm and effort [Exaggerated Use Of Accessory Muscles For Inspiration] : no accessory muscle use [Auscultation Breath Sounds / Voice Sounds] : lungs were clear to auscultation bilaterally [Abdomen Soft] : soft [Abdomen Tenderness] : non-tender [Bowel Sounds] : normal bowel sounds [Abdomen Mass (___ Cm)] : no abdominal mass palpated [Abnormal Walk] : normal gait [Nail Clubbing] : no clubbing of the fingernails [Cyanosis, Localized] : no localized cyanosis [Petechial Hemorrhages (___cm)] : no petechial hemorrhages [Skin Color & Pigmentation] : normal skin color and pigmentation [] : no rash [No Venous Stasis] : no venous stasis [No Skin Ulcers] : no skin ulcer [Skin Lesions] : no skin lesions [No Xanthoma] : no  xanthoma was observed [Deep Tendon Reflexes (DTR)] : deep tendon reflexes were 2+ and symmetric [Sensation] : the sensory exam was normal to light touch and pinprick [No Focal Deficits] : no focal deficits [Oriented To Time, Place, And Person] : oriented to person, place, and time [Impaired Insight] : insight and judgment were intact [Affect] : the affect was normal

## 2019-08-12 ENCOUNTER — APPOINTMENT (OUTPATIENT)
Dept: PULMONOLOGY | Facility: CLINIC | Age: 49
End: 2019-08-12
Payer: COMMERCIAL

## 2019-08-12 VITALS
DIASTOLIC BLOOD PRESSURE: 85 MMHG | OXYGEN SATURATION: 98 % | HEART RATE: 94 BPM | RESPIRATION RATE: 14 BRPM | SYSTOLIC BLOOD PRESSURE: 134 MMHG

## 2019-08-12 LAB — INR PPP: 1.9 RATIO

## 2019-08-12 PROCEDURE — 85610 PROTHROMBIN TIME: CPT | Mod: QW

## 2019-08-12 PROCEDURE — 99213 OFFICE O/P EST LOW 20 MIN: CPT | Mod: 25

## 2019-08-12 RX ORDER — METHYLPREDNISOLONE 4 MG/1
4 TABLET ORAL
Qty: 1 | Refills: 0 | Status: DISCONTINUED | COMMUNITY
Start: 2019-07-16 | End: 2019-08-12

## 2019-08-12 NOTE — HISTORY OF PRESENT ILLNESS
[Stable] : are stable [Difficulty Breathing During Exertion] : denies dyspnea on exertion [Cough] : denies coughing [Feelings Of Weakness On Exertion] : denies exercise intolerance [Wheezing] : denies wheezing [Regional Soft Tissue Swelling Both Lower Extremities] : denies lower extremity edema [Fever] : denies fever [Chest Pain Or Discomfort] : denies chest pain [Date: ___] : Date of most recent diagnostic polysomnogram: [unfilled] [Obstructive Sleep Apnea] : obstructive sleep apnea [FreeTextEntry1] : Complaint states onset past Sunday developed GI symptomatology with abdominal cramping diarrhea\par No reported nausea\par No reported fever\par No's sweats or chills\par No urinary symptomatology\par No localized abdominal pain noted\par Denies any burping indigestion or reflux\par No recent travel\par He felt more likely was related to foods that he may have eaten\par

## 2019-08-12 NOTE — PHYSICAL EXAM
[General Appearance - Well Developed] : well developed [General Appearance - Well Nourished] : well nourished [Normal Conjunctiva] : the conjunctiva exhibited no abnormalities [Eyelids - No Xanthelasma] : the eyelids demonstrated no xanthelasmas [Normal Oropharynx] : normal oropharynx [Neck Appearance] : the appearance of the neck was normal [Thyroid Diffuse Enlargement] : the thyroid was not enlarged [Jugular Venous Distention Increased] : there was no jugular-venous distention [Neck Cervical Mass (___cm)] : no neck mass was observed [Heart Rate And Rhythm] : heart rate and rhythm were normal [Arterial Pulses Normal] : the arterial pulses were normal [Heart Sounds] : normal S1 and S2 [Respiration, Rhythm And Depth] : normal respiratory rhythm and effort [Exaggerated Use Of Accessory Muscles For Inspiration] : no accessory muscle use [Bowel Sounds] : normal bowel sounds [Auscultation Breath Sounds / Voice Sounds] : lungs were clear to auscultation bilaterally [Abdomen Tenderness] : non-tender [Abdomen Soft] : soft [Abdomen Mass (___ Cm)] : no abdominal mass palpated [Abnormal Walk] : normal gait [Nail Clubbing] : no clubbing of the fingernails [Cyanosis, Localized] : no localized cyanosis [Petechial Hemorrhages (___cm)] : no petechial hemorrhages [Skin Color & Pigmentation] : normal skin color and pigmentation [] : no rash [No Venous Stasis] : no venous stasis [Skin Lesions] : no skin lesions [No Skin Ulcers] : no skin ulcer [No Xanthoma] : no  xanthoma was observed [Deep Tendon Reflexes (DTR)] : deep tendon reflexes were 2+ and symmetric [Sensation] : the sensory exam was normal to light touch and pinprick [No Focal Deficits] : no focal deficits [Impaired Insight] : insight and judgment were intact [Oriented To Time, Place, And Person] : oriented to person, place, and time [Affect] : the affect was normal

## 2019-08-12 NOTE — DISCUSSION/SUMMARY
[FreeTextEntry1] : \par recurrent PE\par long term coumadin\par 7.5 mg x 2 night\par  and other nites 5 mg  6 nights\par  has declined change to xarelto\par \par \par Cardiology evaluation review March 7, 2019\par Recommendations of cardiology noted including echocardiogram possible cardiac catheterization CT calcium score\par Borderline hypertension\par Recheck cholesterol\par As noted he did decline use of novel anticoagulation therapy\par Weight loss\par Readdress issue of obstructive sleep apnea-he did complete a sleep study January 20 and 21, 2016 with an overall AHI of 5\par

## 2019-08-12 NOTE — REASON FOR VISIT
[Follow-Up] : a follow-up visit [Pulmonary Embolism] : pulmonary embolism [FreeTextEntry2] : INR check

## 2019-08-16 ENCOUNTER — RX RENEWAL (OUTPATIENT)
Age: 49
End: 2019-08-16

## 2019-09-11 ENCOUNTER — APPOINTMENT (OUTPATIENT)
Dept: PULMONOLOGY | Facility: CLINIC | Age: 49
End: 2019-09-11

## 2019-09-26 ENCOUNTER — APPOINTMENT (OUTPATIENT)
Dept: PULMONOLOGY | Facility: CLINIC | Age: 49
End: 2019-09-26
Payer: COMMERCIAL

## 2019-09-26 VITALS
SYSTOLIC BLOOD PRESSURE: 124 MMHG | DIASTOLIC BLOOD PRESSURE: 80 MMHG | OXYGEN SATURATION: 97 % | HEART RATE: 77 BPM | TEMPERATURE: 98.2 F

## 2019-09-26 DIAGNOSIS — Z23 ENCOUNTER FOR IMMUNIZATION: ICD-10-CM

## 2019-09-26 LAB — INR PPP: 2.1 RATIO

## 2019-09-26 PROCEDURE — 90686 IIV4 VACC NO PRSV 0.5 ML IM: CPT

## 2019-09-26 PROCEDURE — 85610 PROTHROMBIN TIME: CPT | Mod: QW

## 2019-09-26 PROCEDURE — 99213 OFFICE O/P EST LOW 20 MIN: CPT | Mod: 25

## 2019-09-26 PROCEDURE — G0008: CPT

## 2019-09-26 RX ORDER — RIFAXIMIN 200 MG/1
200 TABLET ORAL 3 TIMES DAILY
Qty: 9 | Refills: 0 | Status: DISCONTINUED | COMMUNITY
Start: 2019-05-09 | End: 2019-09-26

## 2019-09-26 NOTE — DISCUSSION/SUMMARY
[FreeTextEntry1] : \par recurrent PE\par long term coumadin\par  Coumadin 5 mg QD\par  has declined change to xarelto the past but at today's visit after discussion he agrees that he would be comfortable starting Xarelto\par I believe based on his history Xarelto 10 mg a day is adequate for protection for recurrent disease\par He will stop the Coumadin 3 days prior to this coming Tuesday return to the office for a repeat INR and as long as it is normal the INR he will start 10 mg of Xarelto on a daily basis\par \par Cardiology evaluation review March 7, 2019\par Recommendations of cardiology noted including echocardiogram possible cardiac catheterization CT calcium score\par Borderline hypertension\par Recheck cholesterol\par As noted he did decline use of novel anticoagulation therapy\par Weight loss\par Readdress issue of obstructive sleep apnea-he did complete a sleep study January 20 and 21, 2016 with an overall AHI of 5\par

## 2019-09-26 NOTE — HISTORY OF PRESENT ILLNESS
[Stable] : are stable [Difficulty Breathing During Exertion] : denies dyspnea on exertion [Feelings Of Weakness On Exertion] : denies exercise intolerance [Cough] : denies coughing [Wheezing] : denies wheezing [Chest Pain Or Discomfort] : denies chest pain [Regional Soft Tissue Swelling Both Lower Extremities] : denies lower extremity edema [Fever] : denies fever [Obstructive Sleep Apnea] : obstructive sleep apnea [Date: ___] : Date of most recent diagnostic polysomnogram: [unfilled] [FreeTextEntry1] : Complaint states onset past Sunday developed GI symptomatology with abdominal cramping diarrhea\par No reported nausea\par No reported fever\par No's sweats or chills\par No urinary symptomatology\par No localized abdominal pain noted\par Denies any burping indigestion or reflux\par No recent travel\par He felt more likely was related to foods that he may have eaten\par

## 2019-09-26 NOTE — PHYSICAL EXAM
[General Appearance - Well Developed] : well developed [Normal Conjunctiva] : the conjunctiva exhibited no abnormalities [General Appearance - Well Nourished] : well nourished [Eyelids - No Xanthelasma] : the eyelids demonstrated no xanthelasmas [Normal Oropharynx] : normal oropharynx [Neck Appearance] : the appearance of the neck was normal [Neck Cervical Mass (___cm)] : no neck mass was observed [Jugular Venous Distention Increased] : there was no jugular-venous distention [Thyroid Diffuse Enlargement] : the thyroid was not enlarged [Heart Rate And Rhythm] : heart rate and rhythm were normal [Heart Sounds] : normal S1 and S2 [Arterial Pulses Normal] : the arterial pulses were normal [Respiration, Rhythm And Depth] : normal respiratory rhythm and effort [Bowel Sounds] : normal bowel sounds [Exaggerated Use Of Accessory Muscles For Inspiration] : no accessory muscle use [Auscultation Breath Sounds / Voice Sounds] : lungs were clear to auscultation bilaterally [Abdomen Soft] : soft [Abdomen Tenderness] : non-tender [Abdomen Mass (___ Cm)] : no abdominal mass palpated [Abnormal Walk] : normal gait [Nail Clubbing] : no clubbing of the fingernails [Cyanosis, Localized] : no localized cyanosis [Petechial Hemorrhages (___cm)] : no petechial hemorrhages [Skin Color & Pigmentation] : normal skin color and pigmentation [] : no rash [No Venous Stasis] : no venous stasis [Skin Lesions] : no skin lesions [No Skin Ulcers] : no skin ulcer [Deep Tendon Reflexes (DTR)] : deep tendon reflexes were 2+ and symmetric [No Xanthoma] : no  xanthoma was observed [Sensation] : the sensory exam was normal to light touch and pinprick [No Focal Deficits] : no focal deficits [Oriented To Time, Place, And Person] : oriented to person, place, and time [Affect] : the affect was normal [Impaired Insight] : insight and judgment were intact

## 2019-09-26 NOTE — REASON FOR VISIT
[Follow-Up] : a follow-up visit [Pulmonary Embolism] : pulmonary embolism [FreeTextEntry2] : AC  check INR

## 2019-09-27 ENCOUNTER — RX RENEWAL (OUTPATIENT)
Age: 49
End: 2019-09-27

## 2019-10-01 ENCOUNTER — APPOINTMENT (OUTPATIENT)
Dept: PULMONOLOGY | Facility: CLINIC | Age: 49
End: 2019-10-01
Payer: COMMERCIAL

## 2019-10-01 VITALS
SYSTOLIC BLOOD PRESSURE: 119 MMHG | DIASTOLIC BLOOD PRESSURE: 87 MMHG | RESPIRATION RATE: 14 BRPM | HEART RATE: 98 BPM | OXYGEN SATURATION: 95 %

## 2019-10-01 DIAGNOSIS — D75.89 OTHER SPECIFIED DISEASES OF BLOOD AND BLOOD-FORMING ORGANS: ICD-10-CM

## 2019-10-01 LAB — INR PPP: 1.2 RATIO

## 2019-10-01 PROCEDURE — 85610 PROTHROMBIN TIME: CPT | Mod: QW

## 2019-10-01 PROCEDURE — 36415 COLL VENOUS BLD VENIPUNCTURE: CPT

## 2019-10-01 PROCEDURE — 99213 OFFICE O/P EST LOW 20 MIN: CPT | Mod: 25

## 2019-10-01 RX ORDER — WARFARIN 5 MG/1
5 TABLET ORAL DAILY
Qty: 90 | Refills: 3 | Status: DISCONTINUED | COMMUNITY
Start: 2018-08-15 | End: 2019-10-01

## 2019-10-01 NOTE — PHYSICAL EXAM
[General Appearance - Well Developed] : well developed [General Appearance - Well Nourished] : well nourished [Normal Conjunctiva] : the conjunctiva exhibited no abnormalities [Eyelids - No Xanthelasma] : the eyelids demonstrated no xanthelasmas [Normal Oropharynx] : normal oropharynx [Neck Cervical Mass (___cm)] : no neck mass was observed [Neck Appearance] : the appearance of the neck was normal [Jugular Venous Distention Increased] : there was no jugular-venous distention [Heart Rate And Rhythm] : heart rate and rhythm were normal [Thyroid Diffuse Enlargement] : the thyroid was not enlarged [Heart Sounds] : normal S1 and S2 [Arterial Pulses Normal] : the arterial pulses were normal [Exaggerated Use Of Accessory Muscles For Inspiration] : no accessory muscle use [Respiration, Rhythm And Depth] : normal respiratory rhythm and effort [Bowel Sounds] : normal bowel sounds [Auscultation Breath Sounds / Voice Sounds] : lungs were clear to auscultation bilaterally [Abdomen Soft] : soft [Abdomen Tenderness] : non-tender [Abdomen Mass (___ Cm)] : no abdominal mass palpated [Abnormal Walk] : normal gait [Nail Clubbing] : no clubbing of the fingernails [Cyanosis, Localized] : no localized cyanosis [Petechial Hemorrhages (___cm)] : no petechial hemorrhages [Skin Color & Pigmentation] : normal skin color and pigmentation [No Venous Stasis] : no venous stasis [] : no rash [No Skin Ulcers] : no skin ulcer [Skin Lesions] : no skin lesions [Deep Tendon Reflexes (DTR)] : deep tendon reflexes were 2+ and symmetric [No Xanthoma] : no  xanthoma was observed [No Focal Deficits] : no focal deficits [Sensation] : the sensory exam was normal to light touch and pinprick [Impaired Insight] : insight and judgment were intact [Oriented To Time, Place, And Person] : oriented to person, place, and time [Affect] : the affect was normal

## 2019-10-02 PROBLEM — D75.89 MACROCYTOSIS WITHOUT ANEMIA: Status: ACTIVE | Noted: 2019-10-02

## 2019-10-02 LAB
ALBUMIN SERPL ELPH-MCNC: 4.4 G/DL
ALP BLD-CCNC: 45 U/L
ALT SERPL-CCNC: 22 U/L
ANION GAP SERPL CALC-SCNC: 13 MMOL/L
AST SERPL-CCNC: 18 U/L
BASOPHILS # BLD AUTO: 0.03 K/UL
BASOPHILS NFR BLD AUTO: 0.9 %
BILIRUB SERPL-MCNC: 0.7 MG/DL
BUN SERPL-MCNC: 19 MG/DL
CALCIUM SERPL-MCNC: 9.5 MG/DL
CHLORIDE SERPL-SCNC: 105 MMOL/L
CO2 SERPL-SCNC: 24 MMOL/L
CREAT SERPL-MCNC: 0.95 MG/DL
EOSINOPHIL # BLD AUTO: 0.06 K/UL
EOSINOPHIL NFR BLD AUTO: 1.7 %
GLUCOSE SERPL-MCNC: 113 MG/DL
HCT VFR BLD CALC: 46.4 %
HGB BLD-MCNC: 14.7 G/DL
IMM GRANULOCYTES NFR BLD AUTO: 0.3 %
LYMPHOCYTES # BLD AUTO: 1.56 K/UL
LYMPHOCYTES NFR BLD AUTO: 44.4 %
MAN DIFF?: NORMAL
MCHC RBC-ENTMCNC: 31.7 GM/DL
MCHC RBC-ENTMCNC: 32.9 PG
MCV RBC AUTO: 103.8 FL
MONOCYTES # BLD AUTO: 0.28 K/UL
MONOCYTES NFR BLD AUTO: 8 %
NEUTROPHILS # BLD AUTO: 1.57 K/UL
NEUTROPHILS NFR BLD AUTO: 44.7 %
PLATELET # BLD AUTO: 229 K/UL
POTASSIUM SERPL-SCNC: 4.6 MMOL/L
PROT SERPL-MCNC: 7.1 G/DL
RBC # BLD: 4.47 M/UL
RBC # FLD: 13.3 %
SODIUM SERPL-SCNC: 142 MMOL/L
WBC # FLD AUTO: 3.51 K/UL

## 2019-10-02 NOTE — REASON FOR VISIT
[Follow-Up] : a follow-up visit [Pulmonary Embolism] : pulmonary embolism [FreeTextEntry2] : Long term AC

## 2019-10-02 NOTE — DISCUSSION/SUMMARY
[FreeTextEntry1] : \par recurrent PE\par long term coumadin\par  Coumadin 5 mg QD-on hold\par  has declined change to xarelto the past but at today's visit after discussion he agrees that he would be comfortable starting Xarelto\par I believe based on his history Xarelto 10 mg a day is adequate for protection for recurrent disease\par He will stop the Coumadin 3 days prior to this coming Tuesday return to the office for a repeat INR and as long as it is normal the INR he will start 10 mg of Xarelto on a daily basis\par Will start xarelto  10 mg QD \par f/u 1  month-recheck CBC\par Follow-up regarding any cardiology follow-up\par Cardiology evaluation review March 7, 2019\par Recommendations of cardiology noted including echocardiogram possible cardiac catheterization CT calcium score\par Borderline hypertension\par Weight loss\par Readdress issue of obstructive sleep apnea-he did complete a sleep study January 20 and 21, 2016 with an overall AHI of 5\par

## 2019-10-02 NOTE — HISTORY OF PRESENT ILLNESS
[Stable] : are stable [Difficulty Breathing During Exertion] : denies dyspnea on exertion [Feelings Of Weakness On Exertion] : denies exercise intolerance [Wheezing] : denies wheezing [Cough] : denies coughing [Regional Soft Tissue Swelling Both Lower Extremities] : denies lower extremity edema [Chest Pain Or Discomfort] : denies chest pain [Fever] : denies fever [Obstructive Sleep Apnea] : obstructive sleep apnea [Date: ___] : Date of most recent diagnostic polysomnogram: [unfilled] [FreeTextEntry1] : Complaint states onset past Sunday developed GI symptomatology with abdominal cramping diarrhea\par No reported nausea\par No reported fever\par No's sweats or chills\par No urinary symptomatology\par No localized abdominal pain noted\par Denies any burping indigestion or reflux\par No recent travel\par He felt more likely was related to foods that he may have eaten\par

## 2019-10-02 NOTE — PROCEDURE
[FreeTextEntry1] : Flu vaccine IM 9/26/2019\par INR  1.2 off coumadin\par Blood draw\par Laboratory data review October 2, 2019\par CBC White blood count 3.51 hemoglobin 14.7 hematocrit 46.4\par .8\par Platelet count 229,000\par Serum electrolytes normal\par Serum glucose 113\par Creatinine 0.95 total protein 7.1 liver function testing normal\par Order B12 folic acid level–any issue with regards to alcohol use\par \par Echocardiogram April 22, 2019\par Normal left ventricle normal right ventricle no evidence of pulmonary hypertension

## 2019-10-25 ENCOUNTER — RX RENEWAL (OUTPATIENT)
Age: 49
End: 2019-10-25

## 2019-11-13 ENCOUNTER — APPOINTMENT (OUTPATIENT)
Dept: PULMONOLOGY | Facility: CLINIC | Age: 49
End: 2019-11-13

## 2019-11-20 ENCOUNTER — RX RENEWAL (OUTPATIENT)
Age: 49
End: 2019-11-20

## 2019-12-23 ENCOUNTER — APPOINTMENT (OUTPATIENT)
Dept: PULMONOLOGY | Facility: CLINIC | Age: 49
End: 2019-12-23
Payer: COMMERCIAL

## 2019-12-23 VITALS
DIASTOLIC BLOOD PRESSURE: 77 MMHG | WEIGHT: 255 LBS | RESPIRATION RATE: 16 BRPM | HEIGHT: 72 IN | SYSTOLIC BLOOD PRESSURE: 118 MMHG | HEART RATE: 78 BPM | OXYGEN SATURATION: 96 % | BODY MASS INDEX: 34.54 KG/M2 | TEMPERATURE: 98.2 F

## 2019-12-23 PROCEDURE — 99213 OFFICE O/P EST LOW 20 MIN: CPT | Mod: 25

## 2019-12-23 PROCEDURE — 94010 BREATHING CAPACITY TEST: CPT

## 2019-12-23 PROCEDURE — 36415 COLL VENOUS BLD VENIPUNCTURE: CPT

## 2019-12-23 PROCEDURE — 94727 GAS DIL/WSHOT DETER LNG VOL: CPT

## 2019-12-23 PROCEDURE — 94729 DIFFUSING CAPACITY: CPT

## 2019-12-23 NOTE — PHYSICAL EXAM
[General Appearance - Well Developed] : well developed [General Appearance - Well Nourished] : well nourished [Eyelids - No Xanthelasma] : the eyelids demonstrated no xanthelasmas [Normal Conjunctiva] : the conjunctiva exhibited no abnormalities [Neck Appearance] : the appearance of the neck was normal [Neck Cervical Mass (___cm)] : no neck mass was observed [Normal Oropharynx] : normal oropharynx [Thyroid Diffuse Enlargement] : the thyroid was not enlarged [Jugular Venous Distention Increased] : there was no jugular-venous distention [Arterial Pulses Normal] : the arterial pulses were normal [Heart Rate And Rhythm] : heart rate and rhythm were normal [Heart Sounds] : normal S1 and S2 [Exaggerated Use Of Accessory Muscles For Inspiration] : no accessory muscle use [Respiration, Rhythm And Depth] : normal respiratory rhythm and effort [Auscultation Breath Sounds / Voice Sounds] : lungs were clear to auscultation bilaterally [Abdomen Tenderness] : non-tender [Bowel Sounds] : normal bowel sounds [Abdomen Soft] : soft [Abnormal Walk] : normal gait [Abdomen Mass (___ Cm)] : no abdominal mass palpated [Petechial Hemorrhages (___cm)] : no petechial hemorrhages [Cyanosis, Localized] : no localized cyanosis [Nail Clubbing] : no clubbing of the fingernails [Skin Color & Pigmentation] : normal skin color and pigmentation [] : no rash [No Venous Stasis] : no venous stasis [Skin Lesions] : no skin lesions [No Skin Ulcers] : no skin ulcer [No Xanthoma] : no  xanthoma was observed [Deep Tendon Reflexes (DTR)] : deep tendon reflexes were 2+ and symmetric [Sensation] : the sensory exam was normal to light touch and pinprick [No Focal Deficits] : no focal deficits [Oriented To Time, Place, And Person] : oriented to person, place, and time [Impaired Insight] : insight and judgment were intact [Affect] : the affect was normal

## 2019-12-23 NOTE — HISTORY OF PRESENT ILLNESS
[Stable] : are stable [Difficulty Breathing During Exertion] : denies dyspnea on exertion [Feelings Of Weakness On Exertion] : denies exercise intolerance [Cough] : denies coughing [Regional Soft Tissue Swelling Both Lower Extremities] : denies lower extremity edema [Chest Pain Or Discomfort] : denies chest pain [Wheezing] : denies wheezing [Fever] : denies fever [Date: ___] : Date of most recent diagnostic polysomnogram: [unfilled] [Obstructive Sleep Apnea] : obstructive sleep apnea [FreeTextEntry1] : Complaint states onset past Sunday developed GI symptomatology with abdominal cramping diarrhea\par No reported nausea\par No reported fever\par No's sweats or chills\par No urinary symptomatology\par No localized abdominal pain noted\par Denies any burping indigestion or reflux\par No recent travel\par He felt more likely was related to foods that he may have eaten\par

## 2019-12-23 NOTE — PROCEDURE
[FreeTextEntry1] : FT without bronchodilator December 23, 2019\par Flow rates normal\par Lung volumes total capacity 77% predicted minimally reduced\par Mild reduction diffusion 66% of predicted.\par Hemoglobin 14.7\par \par Flu vaccine IM 9/26/2019\par INR  1.2 off coumadin\par Blood draw\par \par Laboratory data review October 2, 2019\par CBC White blood count 3.51 hemoglobin 14.7 hematocrit 46.4\par .8\par Platelet count 229,000\par Serum electrolytes normal\par Serum glucose 113\par Creatinine 0.95 total protein 7.1 liver function testing normal\par Order B12 folic acid level–any issue with regards to alcohol use\par \par Echocardiogram April 22, 2019\par Normal left ventricle normal right ventricle no evidence of pulmonary hypertension

## 2019-12-23 NOTE — DISCUSSION/SUMMARY
[FreeTextEntry1] : \par recurrent PE\par long term coumadin\par  Coumadin 5 mg QD-on hold\par  has declined change to xarelto the past but at today's visit after discussion he agrees that he would be comfortable starting Xarelto- 10 mg QD \par f/u 1  month\par \par Cardiology evaluation review March 7, 2019\par Recommendations of cardiology noted including echocardiogram possible cardiac catheterization CT calcium score\par Borderline hypertension\par Recheck cholesterol\par Weight loss\par Readdress issue of obstructive sleep apnea-he did complete a sleep study January 20 and 21, 2016 with an overall AHI of 5\par

## 2019-12-24 LAB
FOLATE SERPL-MCNC: 12.7 NG/ML
PSA SERPL-MCNC: 0.95 NG/ML
VIT B12 SERPL-MCNC: 386 PG/ML

## 2019-12-30 ENCOUNTER — RX RENEWAL (OUTPATIENT)
Age: 49
End: 2019-12-30

## 2020-01-24 ENCOUNTER — RX RENEWAL (OUTPATIENT)
Age: 50
End: 2020-01-24

## 2020-01-25 NOTE — ED ADULT NURSE NOTE - OBJECTIVE STATEMENT
ok Pt received to rm 21 a&ox3 and ambulatory with c/o left sided cp since 3pm. Pt has hx of PE and GURD. Pt denies all other symptoms. 20g IV placed in L ac, Labs sent, meds given. Pt placed ion monitor, will continue to monitor.

## 2020-02-21 ENCOUNTER — RX RENEWAL (OUTPATIENT)
Age: 50
End: 2020-02-21

## 2020-03-20 ENCOUNTER — RX RENEWAL (OUTPATIENT)
Age: 50
End: 2020-03-20

## 2020-03-25 ENCOUNTER — APPOINTMENT (OUTPATIENT)
Dept: PULMONOLOGY | Facility: CLINIC | Age: 50
End: 2020-03-25

## 2020-04-12 ENCOUNTER — RX RENEWAL (OUTPATIENT)
Age: 50
End: 2020-04-12

## 2020-04-21 ENCOUNTER — RX RENEWAL (OUTPATIENT)
Age: 50
End: 2020-04-21

## 2020-05-20 ENCOUNTER — RX RENEWAL (OUTPATIENT)
Age: 50
End: 2020-05-20

## 2020-06-19 ENCOUNTER — RX RENEWAL (OUTPATIENT)
Age: 50
End: 2020-06-19

## 2020-06-22 ENCOUNTER — APPOINTMENT (OUTPATIENT)
Dept: PULMONOLOGY | Facility: CLINIC | Age: 50
End: 2020-06-22
Payer: COMMERCIAL

## 2020-06-22 ENCOUNTER — TRANSCRIPTION ENCOUNTER (OUTPATIENT)
Age: 50
End: 2020-06-22

## 2020-06-22 PROCEDURE — 99213 OFFICE O/P EST LOW 20 MIN: CPT | Mod: 95

## 2020-06-22 NOTE — PHYSICAL EXAM
[General Appearance - Well Developed] : well developed [General Appearance - Well Nourished] : well nourished [Normal Conjunctiva] : the conjunctiva exhibited no abnormalities [Neck Appearance] : the appearance of the neck was normal [Normal Oropharynx] : normal oropharynx [Neck Cervical Mass (___cm)] : no neck mass was observed [Jugular Venous Distention Increased] : there was no jugular-venous distention [Thyroid Diffuse Enlargement] : the thyroid was not enlarged [] : no ischemic changes [Cyanosis, Localized] : no localized cyanosis [Skin Lesions] : no skin lesions [Skin Color & Pigmentation] : normal skin color and pigmentation [No Venous Stasis] : no venous stasis [No Skin Ulcers] : no skin ulcer [No Xanthoma] : no  xanthoma was observed [No Focal Deficits] : no focal deficits [Sensation] : the sensory exam was normal to light touch and pinprick

## 2020-06-22 NOTE — HISTORY OF PRESENT ILLNESS
[Home] : at home, [unfilled] , at the time of the visit. [Medical Office: ([facility])___] : at the medical office located in  [Verbal consent obtained from patient] : the patient, [unfilled] [Never] : never [TextBox_4] : This visit was provided via telehealth using real-time 2-way audio visual technology. The patient, EMMA FLORES , was located at home, [address]\[place] [address]  at the time of the visit.\par The provider, Dr. Franklin Currie, was located at office [facility] [place], [place] at the time of the visit. \par \par  The patient, Mr. EMMA FLORES  and Physician Franklin Currie DO [facility] participated in the telehealth encounter.\par \par Verbal consent obtained by  from patient\par \par Status: The patient reports his symptoms are stable since the last visit. \par Interval Symptoms: denies dyspnea on exertion, denies exercise intolerance, denies coughing, denies wheezing, denies lower extremity edema, denies chest pain, denies fever. \par \par The patient is a 49 year old male with a history of obstructive sleep apnea. \par Diagnostic Polysomnogram:. Date of most recent diagnostic polysomnogram: 1/20/2016. \par Mild PACO Not on active treatment. \par Complaint states onset past Sunday developed GI symptomatology with abdominal cramping diarrhea\par No reported nausea\par No reported fever\par No's sweats or chills\par No urinary symptomatology\par No localized abdominal pain noted\par Denies any burping indigestion or reflux\par No recent travel\par Ho hx COVID

## 2020-06-22 NOTE — PROCEDURE
[FreeTextEntry1] : PFT without bronchodilator December 23, 2019\par Flow rates normal\par Lung volumes total capacity 77% predicted minimally reduced\par Mild reduction diffusion 66% of predicted.\par Hemoglobin 14.7\par \par Flu vaccine IM 9/26/2019\par INR  1.2 off coumadin\par Blood draw\par \par Laboratory data review October 2, 2019\par CBC White blood count 3.51 hemoglobin 14.7 hematocrit 46.4\par .8\par Platelet count 229,000\par Serum electrolytes normal\par Serum glucose 113\par Creatinine 0.95 total protein 7.1 liver function testing normal\par Order B12 folic acid level–any issue with regards to alcohol use\par \par Echocardiogram April 22, 2019\par Normal left ventricle normal right ventricle no evidence of pulmonary hypertension

## 2020-06-23 ENCOUNTER — RX RENEWAL (OUTPATIENT)
Age: 50
End: 2020-06-23

## 2020-07-13 ENCOUNTER — APPOINTMENT (OUTPATIENT)
Dept: PULMONOLOGY | Facility: CLINIC | Age: 50
End: 2020-07-13
Payer: COMMERCIAL

## 2020-07-13 VITALS
BODY MASS INDEX: 34.54 KG/M2 | SYSTOLIC BLOOD PRESSURE: 124 MMHG | RESPIRATION RATE: 16 BRPM | HEART RATE: 90 BPM | DIASTOLIC BLOOD PRESSURE: 87 MMHG | OXYGEN SATURATION: 98 % | WEIGHT: 255 LBS | TEMPERATURE: 98.1 F | HEIGHT: 72 IN

## 2020-07-13 DIAGNOSIS — R21 RASH AND OTHER NONSPECIFIC SKIN ERUPTION: ICD-10-CM

## 2020-07-13 LAB
BASOPHILS # BLD AUTO: 0.02 K/UL
BASOPHILS NFR BLD AUTO: 0.4 %
EOSINOPHIL # BLD AUTO: 0.04 K/UL
EOSINOPHIL NFR BLD AUTO: 0.7 %
HCT VFR BLD CALC: 45.3 %
HGB BLD-MCNC: 14.9 G/DL
IMM GRANULOCYTES NFR BLD AUTO: 0.2 %
LYMPHOCYTES # BLD AUTO: 1.73 K/UL
LYMPHOCYTES NFR BLD AUTO: 32.3 %
MAN DIFF?: NORMAL
MCHC RBC-ENTMCNC: 32.9 GM/DL
MCHC RBC-ENTMCNC: 33 PG
MCV RBC AUTO: 100.4 FL
MONOCYTES # BLD AUTO: 0.35 K/UL
MONOCYTES NFR BLD AUTO: 6.5 %
NEUTROPHILS # BLD AUTO: 3.2 K/UL
NEUTROPHILS NFR BLD AUTO: 59.9 %
PLATELET # BLD AUTO: 235 K/UL
RBC # BLD: 4.51 M/UL
RBC # FLD: 12.7 %
WBC # FLD AUTO: 5.35 K/UL

## 2020-07-13 PROCEDURE — 99214 OFFICE O/P EST MOD 30 MIN: CPT | Mod: 25

## 2020-07-13 PROCEDURE — 36415 COLL VENOUS BLD VENIPUNCTURE: CPT

## 2020-07-13 NOTE — PHYSICAL EXAM
[No Acute Distress] : no acute distress [Well Developed] : well developed [Well Nourished] : well nourished [Well-Appearing] : well-appearing [PERRL] : pupils equal round and reactive to light [Normal Sclera/Conjunctiva] : normal sclera/conjunctiva [Normal Outer Ear/Nose] : the outer ears and nose were normal in appearance [EOMI] : extraocular movements intact [No JVD] : no jugular venous distention [Normal Oropharynx] : the oropharynx was normal [No Lymphadenopathy] : no lymphadenopathy [Supple] : supple [Thyroid Normal, No Nodules] : the thyroid was normal and there were no nodules present [No Respiratory Distress] : no respiratory distress  [No Accessory Muscle Use] : no accessory muscle use [Clear to Auscultation] : lungs were clear to auscultation bilaterally [Normal Rate] : normal rate  [Regular Rhythm] : with a regular rhythm [Normal S1, S2] : normal S1 and S2 [No Carotid Bruits] : no carotid bruits [No Murmur] : no murmur heard [No Abdominal Bruit] : a ~M bruit was not heard ~T in the abdomen [No Varicosities] : no varicosities [No Edema] : there was no peripheral edema [Pedal Pulses Present] : the pedal pulses are present [No Palpable Aorta] : no palpable aorta [No Extremity Clubbing/Cyanosis] : no extremity clubbing/cyanosis [Soft] : abdomen soft [Non Tender] : non-tender [Non-distended] : non-distended [No HSM] : no HSM [No Masses] : no abdominal mass palpated [Normal Posterior Cervical Nodes] : no posterior cervical lymphadenopathy [Normal Bowel Sounds] : normal bowel sounds [Normal Anterior Cervical Nodes] : no anterior cervical lymphadenopathy [No Spinal Tenderness] : no spinal tenderness [No CVA Tenderness] : no CVA  tenderness [No Joint Swelling] : no joint swelling [Grossly Normal Strength/Tone] : grossly normal strength/tone [Coordination Grossly Intact] : coordination grossly intact [No Rash] : no rash [Normal Gait] : normal gait [Deep Tendon Reflexes (DTR)] : deep tendon reflexes were 2+ and symmetric [No Focal Deficits] : no focal deficits [Normal Insight/Judgement] : insight and judgment were intact [Normal Affect] : the affect was normal

## 2020-07-14 PROBLEM — R21 SCROTAL RASH: Status: ACTIVE | Noted: 2020-07-14

## 2020-07-14 NOTE — PLAN
[FreeTextEntry1] :  evaluation for left scrotal boil/rash\par Venipuncture with CBC drawn in office\par

## 2020-07-14 NOTE — HISTORY OF PRESENT ILLNESS
[FreeTextEntry1] : Left scrotal boil bursted today, minor bleeding was easily controlled with paper towel. On Xarelto for h/o PE

## 2020-07-20 ENCOUNTER — RX RENEWAL (OUTPATIENT)
Age: 50
End: 2020-07-20

## 2020-08-19 ENCOUNTER — RX RENEWAL (OUTPATIENT)
Age: 50
End: 2020-08-19

## 2020-09-13 ENCOUNTER — RX RENEWAL (OUTPATIENT)
Age: 50
End: 2020-09-13

## 2020-10-13 ENCOUNTER — RX RENEWAL (OUTPATIENT)
Age: 50
End: 2020-10-13

## 2020-11-12 ENCOUNTER — RX RENEWAL (OUTPATIENT)
Age: 50
End: 2020-11-12

## 2020-12-10 ENCOUNTER — RX RENEWAL (OUTPATIENT)
Age: 50
End: 2020-12-10

## 2020-12-21 PROBLEM — J06.9 ACUTE URI: Status: RESOLVED | Noted: 2019-07-16 | Resolved: 2020-12-21

## 2021-01-09 ENCOUNTER — RX RENEWAL (OUTPATIENT)
Age: 51
End: 2021-01-09

## 2021-02-06 ENCOUNTER — RX RENEWAL (OUTPATIENT)
Age: 51
End: 2021-02-06

## 2021-03-07 ENCOUNTER — RX RENEWAL (OUTPATIENT)
Age: 51
End: 2021-03-07

## 2021-03-15 ENCOUNTER — APPOINTMENT (OUTPATIENT)
Dept: PULMONOLOGY | Facility: CLINIC | Age: 51
End: 2021-03-15
Payer: COMMERCIAL

## 2021-03-15 VITALS
DIASTOLIC BLOOD PRESSURE: 80 MMHG | TEMPERATURE: 98.2 F | HEART RATE: 82 BPM | SYSTOLIC BLOOD PRESSURE: 118 MMHG | OXYGEN SATURATION: 97 %

## 2021-03-15 DIAGNOSIS — Z20.822 CONTACT WITH AND (SUSPECTED) EXPOSURE TO COVID-19: ICD-10-CM

## 2021-03-15 PROCEDURE — 71046 X-RAY EXAM CHEST 2 VIEWS: CPT

## 2021-03-15 PROCEDURE — 99072 ADDL SUPL MATRL&STAF TM PHE: CPT

## 2021-03-15 PROCEDURE — 36415 COLL VENOUS BLD VENIPUNCTURE: CPT

## 2021-03-15 PROCEDURE — 99214 OFFICE O/P EST MOD 30 MIN: CPT | Mod: 25

## 2021-03-15 RX ORDER — TADALAFIL 20 MG/1
20 TABLET ORAL
Qty: 6 | Refills: 0 | Status: ACTIVE | COMMUNITY
Start: 2020-10-15

## 2021-03-15 NOTE — HISTORY OF PRESENT ILLNESS
[Never] : never [TextBox_4] : \par \par Status: The patient reports his symptoms are stable since the last visit. \par Interval Symptoms: denies dyspnea on exertion, denies exercise intolerance, denies coughing, denies wheezing, denies lower extremity edema, denies chest pain, denies fever. \par \par The patient is a 49 year old male with a history of obstructive sleep apnea. \par Diagnostic Polysomnogram:. Date of most recent diagnostic polysomnogram: 1/20/2016. \par Mild PACO Not on active treatment. \par Complaint states onset past Sunday developed GI symptomatology with abdominal cramping diarrhea\par No reported nausea\par No reported fever\par No's sweats or chills\par No urinary symptomatology\par No localized abdominal pain noted\par Denies any burping indigestion or reflux\par No recent travel\par Ho hx COVID

## 2021-03-15 NOTE — DISCUSSION/SUMMARY
[FreeTextEntry1] : \par recurrent PE\par long term coumadin\par  Coumadin 5 mg QD-on hold\par  has declined change to xarelto the past but at today's visit after discussion he agrees that he would be comfortable starting Xarelto- 10 mg QD \par f/u 1  month\par \par Cardiology evaluation review March 7, 2019\par Recommendations of cardiology noted including echocardiogram possible cardiac catheterization CT calcium score\par Borderline hypertension\par Recheck cholesterol\par Weight loss\par Readdress issue of obstructive sleep apnea-he did complete a sleep study January 20 and 21, 2016 with an overall AHI of 5\par PFIZER Covid Vaccine  completed  1  st  dose\par

## 2021-03-15 NOTE — PHYSICAL EXAM
[General Appearance - Well Developed] : well developed [General Appearance - Well Nourished] : well nourished [Normal Conjunctiva] : the conjunctiva exhibited no abnormalities [Normal Oropharynx] : normal oropharynx [Neck Appearance] : the appearance of the neck was normal [Neck Cervical Mass (___cm)] : no neck mass was observed [Jugular Venous Distention Increased] : there was no jugular-venous distention [Thyroid Diffuse Enlargement] : the thyroid was not enlarged [Cyanosis, Localized] : no localized cyanosis [] : no ischemic changes [Skin Color & Pigmentation] : normal skin color and pigmentation [No Venous Stasis] : no venous stasis [Skin Lesions] : no skin lesions [No Skin Ulcers] : no skin ulcer [No Xanthoma] : no  xanthoma was observed [Sensation] : the sensory exam was normal to light touch and pinprick [No Focal Deficits] : no focal deficits

## 2021-03-15 NOTE — PROCEDURE
[FreeTextEntry1] : Chest x-ray PA lateral March 15, 2021\par Normal cardiac size\par No parenchymal infiltrates pleural effusions or dominant pulmonary nodules\par Soft tissue bony structures unremarkable\par Impression clear lungs\par \par PFT without bronchodilator December 23, 2019\par Flow rates normal\par Lung volumes total capacity 77% predicted minimally reduced\par Mild reduction diffusion 66% of predicted.\par Hemoglobin 14.7\par \par Flu vaccine IM 9/26/2019\par INR  1.2 off coumadin\par \par Blood draw\par \par Laboratory data review October 2, 2019\par CBC White blood count 3.51 hemoglobin 14.7 hematocrit 46.4\par .8\par Platelet count 229,000\par Serum electrolytes normal\par Serum glucose 113\par Creatinine 0.95 total protein 7.1 liver function testing normal\par Order B12 folic acid level– nl\par \par Echocardiogram April 22, 2019\par Normal left ventricle normal right ventricle no evidence of pulmonary hypertension

## 2021-03-16 ENCOUNTER — NON-APPOINTMENT (OUTPATIENT)
Age: 51
End: 2021-03-16

## 2021-03-16 LAB
ALBUMIN SERPL ELPH-MCNC: 4.7 G/DL
ALP BLD-CCNC: 59 U/L
ALT SERPL-CCNC: 13 U/L
ANION GAP SERPL CALC-SCNC: 16 MMOL/L
AST SERPL-CCNC: 15 U/L
BASOPHILS # BLD AUTO: 0.02 K/UL
BASOPHILS NFR BLD AUTO: 0.4 %
BILIRUB SERPL-MCNC: 0.4 MG/DL
BUN SERPL-MCNC: 18 MG/DL
CALCIUM SERPL-MCNC: 9.7 MG/DL
CHLORIDE SERPL-SCNC: 104 MMOL/L
CO2 SERPL-SCNC: 25 MMOL/L
CREAT SERPL-MCNC: 1.12 MG/DL
EOSINOPHIL # BLD AUTO: 0.07 K/UL
EOSINOPHIL NFR BLD AUTO: 1.3 %
GLUCOSE SERPL-MCNC: 97 MG/DL
HCT VFR BLD CALC: 45.3 %
HGB BLD-MCNC: 15.2 G/DL
IMM GRANULOCYTES NFR BLD AUTO: 0.2 %
LYMPHOCYTES # BLD AUTO: 2.21 K/UL
LYMPHOCYTES NFR BLD AUTO: 40.7 %
MAN DIFF?: NORMAL
MCHC RBC-ENTMCNC: 32.7 PG
MCHC RBC-ENTMCNC: 33.6 GM/DL
MCV RBC AUTO: 97.4 FL
MONOCYTES # BLD AUTO: 0.43 K/UL
MONOCYTES NFR BLD AUTO: 7.9 %
NEUTROPHILS # BLD AUTO: 2.69 K/UL
NEUTROPHILS NFR BLD AUTO: 49.5 %
PLATELET # BLD AUTO: 234 K/UL
POTASSIUM SERPL-SCNC: 4.6 MMOL/L
PROT SERPL-MCNC: 7.8 G/DL
RBC # BLD: 4.65 M/UL
RBC # FLD: 12.1 %
SODIUM SERPL-SCNC: 144 MMOL/L
WBC # FLD AUTO: 5.43 K/UL

## 2021-04-11 ENCOUNTER — RX RENEWAL (OUTPATIENT)
Age: 51
End: 2021-04-11

## 2021-04-15 ENCOUNTER — NON-APPOINTMENT (OUTPATIENT)
Age: 51
End: 2021-04-15

## 2021-04-15 ENCOUNTER — APPOINTMENT (OUTPATIENT)
Dept: PULMONOLOGY | Facility: CLINIC | Age: 51
End: 2021-04-15
Payer: COMMERCIAL

## 2021-04-15 VITALS
TEMPERATURE: 98.2 F | DIASTOLIC BLOOD PRESSURE: 79 MMHG | HEART RATE: 78 BPM | SYSTOLIC BLOOD PRESSURE: 127 MMHG | OXYGEN SATURATION: 98 %

## 2021-04-15 PROCEDURE — 93000 ELECTROCARDIOGRAM COMPLETE: CPT

## 2021-04-15 PROCEDURE — 99214 OFFICE O/P EST MOD 30 MIN: CPT | Mod: 25

## 2021-04-15 PROCEDURE — 71046 X-RAY EXAM CHEST 2 VIEWS: CPT

## 2021-04-15 PROCEDURE — 99072 ADDL SUPL MATRL&STAF TM PHE: CPT

## 2021-04-15 PROCEDURE — 36415 COLL VENOUS BLD VENIPUNCTURE: CPT

## 2021-04-15 NOTE — PROCEDURE
[FreeTextEntry1] : Chest x-ray PA lateral April 15, 2021\par Cardiac size abnormal\par Clear lung fields\par No parenchymal infiltrates pleural effusions or dominant pulmonary nodules\par Soft tissue bony structures unremarkable\par Comparison to prior chest x-rays without interval change\par \par EKG 4/15/21\par  NSR\par \par Chest x-ray PA lateral March 15, 2021\par Normal cardiac size\par No parenchymal infiltrates pleural effusions or dominant pulmonary nodules\par Soft tissue bony structures unremarkable\par Impression clear lungs\par \par PFT without bronchodilator December 23, 2019\par Flow rates normal\par Lung volumes total capacity 77% predicted minimally reduced\par Mild reduction diffusion 66% of predicted.\par Hemoglobin 14.7\par \par Flu vaccine IM 9/26/2019\par INR  1.2 off coumadin\par \par Blood draw\par \par Echocardiogram April 22, 2019\par Normal left ventricle normal right ventricle no evidence of pulmonary hypertension

## 2021-04-15 NOTE — HISTORY OF PRESENT ILLNESS
[Never] : never [TextBox_4] : Chief complaint atypical chest discomfort\par staes 1 week prior EKG with  cardio Shree Waller MD \par Ca score 1 yr prior 0\par \par No associated nausea vomiting diarrhea\par No joint pain back pain radiation of pain\par Clearly states this is related to indigestion-like symptomatology\par He is not having his good clinical response to his intermittent use of PPI Prilosec\par Not actively wheezing\par His concern regarding embolic disease on low-dose Xarelto with a prior history\par \par Status: The patient reports his symptoms are stable since the last visit. \par Interval Symptoms: denies dyspnea on exertion, denies exercise intolerance, denies coughing, denies wheezing, denies lower extremity edema, denies chest pain, denies fever. \par \par The patient is a 49 year old male with a history of obstructive sleep apnea. \par Diagnostic Polysomnogram:. Date of most recent diagnostic polysomnogram: 1/20/2016. \par Mild PACO Not on active treatment. \par Complaint states onset past Sunday developed GI symptomatology with abdominal cramping diarrhea\par No reported nausea\par No reported fever\par No's sweats or chills\par No urinary symptomatology\par No localized abdominal pain noted\par Denies any burping indigestion or reflux\par No recent travel\par Ho hx COVID

## 2021-04-15 NOTE — DISCUSSION/SUMMARY
[FreeTextEntry1] : GERD Aty CP with incomplete response to PPI\par recurrent PE\par long term coumadin\par  Coumadin 5 mg QD-on hold\par  has declined change to xarelto the past but at today's visit after discussion he agrees that he would be comfortable starting Xarelto- 10 mg QD \par f/u 1  month\par DVT PE lab protocol\par cardiology consult\par Borderline hypertension\par Recheck cholesterol\par Weight loss\par Readdress issue of obstructive sleep apnea-he did complete a sleep study January 20 and 21, 2016 with an overall AHI of 5\par PFIZER Covid Vaccine  completed  1  st  dose\par states recent  cardiac 1 week prior\par  cont prilosec but add short term Pepcid 40  mg\par \par

## 2021-04-16 ENCOUNTER — NON-APPOINTMENT (OUTPATIENT)
Age: 51
End: 2021-04-16

## 2021-04-16 LAB
ANION GAP SERPL CALC-SCNC: 13 MMOL/L
BASOPHILS # BLD AUTO: 0.03 K/UL
BASOPHILS NFR BLD AUTO: 0.5 %
BUN SERPL-MCNC: 20 MG/DL
CALCIUM SERPL-MCNC: 10.3 MG/DL
CHLORIDE SERPL-SCNC: 102 MMOL/L
CO2 SERPL-SCNC: 24 MMOL/L
CREAT SERPL-MCNC: 1.06 MG/DL
DEPRECATED D DIMER PPP IA-ACNC: <150 NG/ML DDU
EOSINOPHIL # BLD AUTO: 0.05 K/UL
EOSINOPHIL NFR BLD AUTO: 0.9 %
GLUCOSE SERPL-MCNC: 85 MG/DL
HCT VFR BLD CALC: 46.6 %
HGB BLD-MCNC: 15.6 G/DL
IMM GRANULOCYTES NFR BLD AUTO: 0.4 %
LYMPHOCYTES # BLD AUTO: 2.44 K/UL
LYMPHOCYTES NFR BLD AUTO: 42.8 %
MAN DIFF?: NORMAL
MCHC RBC-ENTMCNC: 32.4 PG
MCHC RBC-ENTMCNC: 33.5 GM/DL
MCV RBC AUTO: 96.9 FL
MONOCYTES # BLD AUTO: 0.47 K/UL
MONOCYTES NFR BLD AUTO: 8.2 %
NEUTROPHILS # BLD AUTO: 2.69 K/UL
NEUTROPHILS NFR BLD AUTO: 47.2 %
PLATELET # BLD AUTO: 246 K/UL
POTASSIUM SERPL-SCNC: 4.5 MMOL/L
RBC # BLD: 4.81 M/UL
RBC # FLD: 12.2 %
SODIUM SERPL-SCNC: 139 MMOL/L
WBC # FLD AUTO: 5.7 K/UL

## 2021-04-19 ENCOUNTER — RX RENEWAL (OUTPATIENT)
Age: 51
End: 2021-04-19

## 2021-05-10 ENCOUNTER — RX RENEWAL (OUTPATIENT)
Age: 51
End: 2021-05-10

## 2021-05-26 ENCOUNTER — APPOINTMENT (OUTPATIENT)
Dept: PULMONOLOGY | Facility: CLINIC | Age: 51
End: 2021-05-26
Payer: COMMERCIAL

## 2021-05-26 VITALS
WEIGHT: 215 LBS | DIASTOLIC BLOOD PRESSURE: 100 MMHG | OXYGEN SATURATION: 96 % | BODY MASS INDEX: 29.12 KG/M2 | HEIGHT: 72 IN | TEMPERATURE: 97.8 F | HEART RATE: 86 BPM | SYSTOLIC BLOOD PRESSURE: 135 MMHG

## 2021-05-26 DIAGNOSIS — R10.13 EPIGASTRIC PAIN: ICD-10-CM

## 2021-05-26 PROCEDURE — 36415 COLL VENOUS BLD VENIPUNCTURE: CPT

## 2021-05-26 PROCEDURE — 99072 ADDL SUPL MATRL&STAF TM PHE: CPT

## 2021-05-26 PROCEDURE — 99213 OFFICE O/P EST LOW 20 MIN: CPT | Mod: 25

## 2021-05-26 NOTE — PHYSICAL EXAM
[General Appearance - Well Developed] : well developed [General Appearance - Well Nourished] : well nourished [Normal Conjunctiva] : the conjunctiva exhibited no abnormalities [Normal Oropharynx] : normal oropharynx [Neck Appearance] : the appearance of the neck was normal [Neck Cervical Mass (___cm)] : no neck mass was observed [Jugular Venous Distention Increased] : there was no jugular-venous distention [Thyroid Diffuse Enlargement] : the thyroid was not enlarged [Cyanosis, Localized] : no localized cyanosis [] : no ischemic changes [Skin Color & Pigmentation] : normal skin color and pigmentation [No Venous Stasis] : no venous stasis [Skin Lesions] : no skin lesions [No Skin Ulcers] : no skin ulcer [No Xanthoma] : no  xanthoma was observed [No Focal Deficits] : no focal deficits [Sensation] : the sensory exam was normal to light touch and pinprick

## 2021-05-26 NOTE — DISCUSSION/SUMMARY
[FreeTextEntry1] : GERD Aty CP with incomplete response to PPI- resolved with tx\par recurrent PE\par  Xarelto- 10 mg QD \par f/u 1  month\par cardiology consult\par Borderline hypertension\par Recheck cholesterol\par Weight loss\par Readdress issue of obstructive sleep apnea-he did complete a sleep study January 20 and 21, 2016 with an overall AHI of 5\par PFIZER Covid Vaccine  completed  x 2 doses\par \par  cont prilosec but add short term Pepcid 40  mg\par \par

## 2021-05-27 ENCOUNTER — NON-APPOINTMENT (OUTPATIENT)
Age: 51
End: 2021-05-27

## 2021-05-27 LAB
CHOLEST SERPL-MCNC: 243 MG/DL
HDLC SERPL-MCNC: 68 MG/DL
LDLC SERPL CALC-MCNC: 136 MG/DL
NONHDLC SERPL-MCNC: 175 MG/DL
TRIGL SERPL-MCNC: 197 MG/DL

## 2021-06-10 ENCOUNTER — RX RENEWAL (OUTPATIENT)
Age: 51
End: 2021-06-10

## 2021-06-14 ENCOUNTER — RX RENEWAL (OUTPATIENT)
Age: 51
End: 2021-06-14

## 2021-07-10 ENCOUNTER — RX RENEWAL (OUTPATIENT)
Age: 51
End: 2021-07-10

## 2021-08-08 ENCOUNTER — RX RENEWAL (OUTPATIENT)
Age: 51
End: 2021-08-08

## 2021-08-26 ENCOUNTER — APPOINTMENT (OUTPATIENT)
Dept: PULMONOLOGY | Facility: CLINIC | Age: 51
End: 2021-08-26

## 2021-09-05 ENCOUNTER — EMERGENCY (EMERGENCY)
Facility: HOSPITAL | Age: 51
LOS: 1 days | Discharge: HOME | End: 2021-09-05
Attending: EMERGENCY MEDICINE | Admitting: EMERGENCY MEDICINE
Payer: COMMERCIAL

## 2021-09-05 VITALS
RESPIRATION RATE: 18 BRPM | SYSTOLIC BLOOD PRESSURE: 132 MMHG | HEIGHT: 72 IN | TEMPERATURE: 98 F | DIASTOLIC BLOOD PRESSURE: 84 MMHG | OXYGEN SATURATION: 100 % | HEART RATE: 71 BPM

## 2021-09-05 PROCEDURE — 99284 EMERGENCY DEPT VISIT MOD MDM: CPT

## 2021-09-05 PROCEDURE — 72110 X-RAY EXAM L-2 SPINE 4/>VWS: CPT | Mod: 26

## 2021-09-05 RX ORDER — CYCLOBENZAPRINE HYDROCHLORIDE 10 MG/1
10 TABLET, FILM COATED ORAL ONCE
Refills: 0 | Status: COMPLETED | OUTPATIENT
Start: 2021-09-05 | End: 2021-09-05

## 2021-09-05 RX ORDER — CYCLOBENZAPRINE HYDROCHLORIDE 10 MG/1
1 TABLET, FILM COATED ORAL
Qty: 6 | Refills: 0
Start: 2021-09-05 | End: 2021-09-06

## 2021-09-05 RX ORDER — LIDOCAINE 4 G/100G
1 CREAM TOPICAL ONCE
Refills: 0 | Status: COMPLETED | OUTPATIENT
Start: 2021-09-05 | End: 2021-09-05

## 2021-09-05 RX ORDER — IBUPROFEN 200 MG
600 TABLET ORAL ONCE
Refills: 0 | Status: DISCONTINUED | OUTPATIENT
Start: 2021-09-05 | End: 2021-09-05

## 2021-09-05 RX ADMIN — CYCLOBENZAPRINE HYDROCHLORIDE 10 MILLIGRAM(S): 10 TABLET, FILM COATED ORAL at 14:18

## 2021-09-05 RX ADMIN — LIDOCAINE 1 PATCH: 4 CREAM TOPICAL at 14:19
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
## 2021-09-05 NOTE — ED POST DISCHARGE NOTE - RESULT SUMMARY
JENELLE Nelson CDU: pt called asking to change pharmacy for prescription sent. flexeril 10mg every 8 hours [facility] to [facility] team which is closed for holiday weekend. I sent it to updated pharmacy at [facility]. aware not to drive while taking medication

## 2021-09-05 NOTE — ED PROVIDER NOTE - CLINICAL SUMMARY MEDICAL DECISION MAKING FREE TEXT BOX
back pain from a fall with no red flags to suggest cord involvement. Likely muscular strain but will image to r/o fracture and encourage f/u
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
## 2021-09-05 NOTE — ED PROVIDER NOTE - NSICDXPASTMEDICALHX_GEN_ALL_CORE_FT
PAST MEDICAL HISTORY:  DVT (deep venous thrombosis)     Hypogonadism male     Obesity     Pulmonary embolism

## 2021-09-05 NOTE — ED PROVIDER NOTE - PROGRESS NOTE DETAILS
Kareem Giordano DO PGY-3: no new complains. Pain improved but persistent. Still no red flags. Return precautions are discussed.

## 2021-09-05 NOTE — ED PROVIDER NOTE - NSFOLLOWUPINSTRUCTIONS_ED_ALL_ED_FT
You were seen for back pain. This is likely a muscle spasm.     No signs of emergency medical condition on today's workup.  Your results are attached with your discharge instructions, please review them with your primary care physician. If there is a result pending, you will receive a call if test is positive.    A presumptive diagnosis is made today, but further evaluation may be required by your primary care doctor and/or specialist for a definitive diagnosis. Therefore, follow up as directed and if symptoms change/worsen or any emergency conditions, please return to the ER.    For pain or fever you can ibuprofen (motrin, advil) or tylenol as needed, as directed on packaging.    If needed, call patient access services at [phone] to find a primary care doctor, or call at [phone] to make an appointment at the clinic.      Low Back Strain    WHAT YOU NEED TO KNOW:    Low back strain is an injury to your lower back muscles or tendons. Tendons are strong tissues that connect muscles to bones. The lower back supports most of your body weight and helps you move, twist, and bend.    DISCHARGE INSTRUCTIONS:    Seek care immediately if:   •You hear or feel a pop in your lower back.    •You have increased swelling or pain in your lower back.    •You have trouble moving your legs.    •Your legs are numb.    Contact your healthcare provider if:   •You have a fever.    •Your pain does not go away, even after treatment.    •You have questions or concerns about your condition or care.    Medicines: The following medicines may be ordered by your healthcare provider:   •Acetaminophen decreases pain and fever. It is available without a doctor's order. Ask how much to take and how often to take it. Follow directions. Acetaminophen can cause liver damage if not taken correctly.    •NSAIDs, such as ibuprofen, help decrease swelling, pain, and fever. This medicine is available with or without a doctor's order. NSAIDs can cause stomach bleeding or kidney problems in certain people. If you take blood thinner medicine, always ask your healthcare provider if NSAIDs are safe for you. Always read the medicine label and follow directions.  •Muscle relaxers help decrease pain and muscle spasms.    •Prescription pain medicine may be given. Ask how to take this medicine safely.    •Take your medicine as directed. Contact your healthcare provider if you think your medicine is not helping or if you have side effects. Tell him or her if you are allergic to any medicine. Keep a list of the medicines, vitamins, and herbs you take. Include the amounts, and when and why you take them. Bring the list or the pill bottles to follow-up visits. Carry your medicine list with you in case of an emergency.    Self-care:   •Rest as directed. You may need to rest in bed for a period of time after your injury. Do not lift heavy objects.     •Apply ice on your back for 15 to 20 minutes every hour or as directed. Use an ice pack, or put crushed ice in a plastic bag. Cover it with a towel. Ice helps prevent tissue damage and decreases swelling and pain.    •Apply heat on your lower back for 20 to 30 minutes every 2 hours for as many days as directed. Heat helps decrease pain and muscle spasms.    •Slowly start to increase your activity as the pain decreases, or as directed.    Prevent another low back strain:   •Use correct body movements. ?Bend at the hips and knees when you  objects. Do not bend from the waist. Use your leg muscles as you lift the load. Do not use your back. Keep the object close to your chest as you lift it. Try not to twist or lift anything above your waist.    ?Change your position often when you stand for long periods of time. Rest one foot on a small box or footrest, and then switch to the other foot often.    ?Try not to sit for long periods of time. When you do, sit in a straight-backed chair with your feet flat on the floor.    ?Never reach, pull, or push while you are sitting.    •Warm up before you exercise. Do exercises that strengthen your back muscles. Ask your healthcare provider about the best exercise plan for you.    •Maintain a healthy weight. Ask your healthcare provider how much you should weigh. Ask him to help you create a weight loss plan if you are overweight.

## 2021-09-05 NOTE — ED PROVIDER NOTE - PATIENT PORTAL LINK FT
You can access the FollowMyHealth Patient Portal offered by [facility] by registering at the following website: [email or web address]. By joining [facility]’s FollowMyHealth portal, you will also be able to view your health information using other applications (apps) compatible with our system.

## 2021-09-09 ENCOUNTER — NON-APPOINTMENT (OUTPATIENT)
Age: 51
End: 2021-09-09

## 2021-09-09 DIAGNOSIS — M54.9 DORSALGIA, UNSPECIFIED: ICD-10-CM

## 2021-09-24 ENCOUNTER — NON-APPOINTMENT (OUTPATIENT)
Age: 51
End: 2021-09-24

## 2021-09-24 ENCOUNTER — APPOINTMENT (OUTPATIENT)
Dept: ORTHOPEDIC SURGERY | Facility: CLINIC | Age: 51
End: 2021-09-24
Payer: COMMERCIAL

## 2021-09-24 VITALS
SYSTOLIC BLOOD PRESSURE: 132 MMHG | HEART RATE: 93 BPM | WEIGHT: 240 LBS | HEIGHT: 72 IN | DIASTOLIC BLOOD PRESSURE: 85 MMHG | BODY MASS INDEX: 32.51 KG/M2

## 2021-09-24 DIAGNOSIS — M47.816 SPONDYLOSIS W/OUT MYELOPATHY OR RADICULOPATHY, LUMBAR REGION: ICD-10-CM

## 2021-09-24 PROCEDURE — 99203 OFFICE O/P NEW LOW 30 MIN: CPT
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
## 2021-09-24 NOTE — PHYSICAL EXAM
[de-identified] : Constitutional: Well-nourished, well-developed, No acute distress\par Respiratory:  Good respiratory effort, no SOB\par Lymphatic: No regional lymphadenopathy, no lymphedema\par Psychiatric: Pleasant and normal affect, alert and oriented x3\par Musculoskeletal: normal except where as noted in regional exam\par \par Lumbar Spine Exam\par \par APPEARANCE: no marked deformities or malalignment, normal curvature of the lumbosacral spine. good posture\par POSITIVE TENDERNESS: + Left SI joint, left lumbar tenderness and spasm noted in erector spinae and quadratus lumborum\par NONTENDER: no bony midline tenderness, no marked tenderness in right lumbar musculature.\par ROM: full, although painful at end range of flexion and extension\par RESISTIVE TESTING: painless 5/5 resisted flex/ext, sidebending b/l, and rotation\par SPECIAL TESTS: neg SLR b/l, neg USMAN b/l, neg Trendelenburg b/l \par PULSES: 2+ DP/PT pulses\par NEURO:  L1 - S2 intact to sensation and motor, DTRs 2+/4 patella and achilles\par  [de-identified] : I reviewed, interpreted and clinically correlated the following outside imaging studies,\par  EXAM: XR LS SPINE W OBLIQUES MIN 4V\par \par \par PROCEDURE DATE: Sep 5 2021\par \par \par \par INTERPRETATION: CLINICAL INDICATION: Fall.\par \par TECHNIQUE: X-ray lumbar spine 4 views\par \par COMPARISON: None\par \par FINDINGS:\par \par No acute fracture or dislocation.\par Joint spaces are maintained.\par No soft tissue swelling. No abnormal soft tissue mineralization.\par Degenerative changes of the lumbar spine\par \par IMPRESSION: No acute vertebral pathology.\par

## 2021-09-24 NOTE — DISCUSSION/SUMMARY
[de-identified] : Discussed findings of today's exam and possible causes of patient's pain.  Educated patient on their most probable diagnosis of acute low back pain status post fall due to exacerbation of underlying osteoarthritis and degenerative disc disease.  Reviewed possible courses of treatment, and we collaboratively decided best course of treatment at this time will include conservative management.  Patient has multiple year history of intermittent low back pain due to osteoarthritis.  He had a recent fall out of a golf cart which exacerbated his pain, however he has no evidence of acute radiculopathy.  No evidence of new disc herniation or nerve impingement as patient does not have any significant weakness or sensory deficits on clinical exam.  Patient is advised he has primarily exacerbation of underlying osteoarthritis, ligament sprain, and resultant paraspinal muscle spasm.  Patient was given methocarbamol by his PCP, he was given a 5-day supply which he found very helpful, he is requesting a refill of this medication.  Prescription given for muscle relaxant methocarbamol, advised the patient of potential side effect of drowsiness, advised best to take this medication 1-2 hours before bedtime for relief in the evening with minimal chance of side effect in the morning (We discussed all possible side effects of this medication).  Patient has been taking oral NSAIDs intermittently for pain, he is advised that he should not be taking oral NSAIDs as he is on anticoagulation therapy, he states he is aware of this contraindication but wanted to take the medicine to help alleviate pain.  I do not recommend any prescription oral NSAIDs at this time.  Patient may take Tylenol as needed for pain.  Patient is advised he would greatly benefit from a course of physical therapy for some guided rehabilitation, a prescription has been provided today.  Patient is given reassurance that he seemingly has a minor injury as he is already been able to return to some golf activity since his injury.  No need for advanced imaging at this time.  Patient advised this will likely take another 3-4 weeks to fully resolve.  Follow up as needed.  Patient appreciates and agrees with current plan.\par \par I work as part of an academic orthopedic group and routinely have a physician in training (resident / fellow) working with me.  Any part of the history and physical exam performed by the physician in training was either directly reviewed and/or replicated by myself.  Any procedure performed by the physician in training was performed under my direct supervision and with the consent of the patient.\par \par This note was generated using dragon medical dictation software.  A reasonable effort has been made for proofreading its contents, but typos may still remain.  If there are any questions or points of clarification needed please notify my office.\par

## 2021-10-08 ENCOUNTER — RX RENEWAL (OUTPATIENT)
Age: 51
End: 2021-10-08

## 2021-10-18 ENCOUNTER — APPOINTMENT (OUTPATIENT)
Dept: PULMONOLOGY | Facility: CLINIC | Age: 51
End: 2021-10-18
Payer: COMMERCIAL

## 2021-10-18 VITALS
HEIGHT: 72 IN | HEART RATE: 76 BPM | DIASTOLIC BLOOD PRESSURE: 82 MMHG | BODY MASS INDEX: 32.51 KG/M2 | SYSTOLIC BLOOD PRESSURE: 143 MMHG | TEMPERATURE: 98.4 F | WEIGHT: 240 LBS | OXYGEN SATURATION: 97 %

## 2021-10-18 PROCEDURE — 99213 OFFICE O/P EST LOW 20 MIN: CPT

## 2021-10-18 NOTE — DISCUSSION/SUMMARY
[FreeTextEntry1] : GERD Aty CP with incomplete response to PPI- resolved with tx\par recurrent PE\par  Xarelto- 10 mg QD \par f/u 1  month\par cardiology consult with f/u noted  verbal report noted\par Borderline hypertension\par Recheck cholesterol\par Weight loss\par Readdress issue of obstructive sleep apnea-he did complete a sleep study January 20 and 21, 2016 with an overall AHI of 5\par PFIZER Covid Vaccine  completed  x 2 doses with advised COVID  booster\par \par  cont prilosec but add short term Pepcid 40  mg- OFF\par flu shot up  to  date - \par f/u ;ipid 3  konth \par f/u PFT 3  months' cont weight loss \par

## 2021-10-18 NOTE — HISTORY OF PRESENT ILLNESS
[Never] : never [TextBox_4] : Chief complaint atypical chest discomfort\par staes 1 week prior EKG with  cardio Shree Waller MD \par Ca score 0\par carotids  neg\par recommended to hold lipid  lowering agents\par weight 240 post  15 ib weight loss\par \par \par Status: The patient reports his symptoms are stable since the last visit. \par Interval Symptoms: denies dyspnea on exertion, denies exercise intolerance, denies coughing, denies wheezing, denies lower extremity edema, denies chest pain, denies fever. \par \par The patient is a 49 year old male with a history of obstructive sleep apnea. \par Diagnostic Polysomnogram:. Date of most recent diagnostic polysomnogram: 1/20/2016. \par Mild PACO Not on active treatment. \par Complaint states onset past Sunday developed GI symptomatology with abdominal cramping diarrhea\par No reported nausea\par No reported fever\par No's sweats or chills\par No urinary symptomatology\par No localized abdominal pain noted\par Denies any burping indigestion or reflux\par No recent travel\par Ho hx COVID

## 2021-12-12 ENCOUNTER — RX RENEWAL (OUTPATIENT)
Age: 51
End: 2021-12-12

## 2021-12-13 NOTE — ED PROVIDER NOTE - VASCULAR COMPROMISE
Additional Notes: Patient consent was obtained to proceed with the visit and recommended plan of care after discussion of all risks and benefits, including the risks of COVID-19 exposure. Detail Level: Simple Render Risk Assessment In Note?: yes no vascular compromise

## 2021-12-23 ENCOUNTER — APPOINTMENT (OUTPATIENT)
Dept: PAIN MANAGEMENT | Facility: CLINIC | Age: 51
End: 2021-12-23
Payer: COMMERCIAL

## 2021-12-23 ENCOUNTER — NON-APPOINTMENT (OUTPATIENT)
Age: 51
End: 2021-12-23

## 2021-12-23 VITALS
HEIGHT: 72 IN | BODY MASS INDEX: 32.51 KG/M2 | DIASTOLIC BLOOD PRESSURE: 80 MMHG | WEIGHT: 240 LBS | HEART RATE: 90 BPM | SYSTOLIC BLOOD PRESSURE: 122 MMHG

## 2021-12-23 PROCEDURE — 99204 OFFICE O/P NEW MOD 45 MIN: CPT

## 2022-01-12 ENCOUNTER — RX RENEWAL (OUTPATIENT)
Age: 52
End: 2022-01-12

## 2022-01-12 ENCOUNTER — TRANSCRIPTION ENCOUNTER (OUTPATIENT)
Age: 52
End: 2022-01-12

## 2022-01-13 ENCOUNTER — TRANSCRIPTION ENCOUNTER (OUTPATIENT)
Age: 52
End: 2022-01-13

## 2022-01-13 NOTE — PHYSICAL EXAM
[FreeTextEntry1] : General appearance: Well nourished and with attention to grooming.\par Neck/spine: Normal carotid pulses, without bruits. Normal range of motion in the neck.\par CV: RRR.\par Skin: No rash.\par Musculoskeletal: No joint deformities, no scoliosis, lordosis, kyphosis, pes cavus or hammer toes.\par Extremities: No peripheral edema.\par Neurological exam:\par Mental status: Patient is alert, attentive and oriented X3. Speech is coherent and fluent without dysarthria or aphasia. \par CN: Pupils were 5 mm and reactive to light. Extraocular movements were full. Visual fields are intact to confrontation. No nystagmus. There was no face, jaw, palate or tongue weakness or atrophy. Facial sensation was normal and symmetric. Hearing was grossly intact. Shoulder shrug was normal.\par Motor exam revealed normal bulk and tone. There is no evidence of atrophy or fasciculations. There is no pronator drift. There is no action or percussion myotonia. Manual muscle testing revealed 5/5 strength throughout including neck flexion/extension and proximal and distal muscles of the arms and legs.\par Sensory exam demonstrated was normal to touch, pin, proprioception, and vibration in arms and legs. Romberg was negative. \par DTRs: 2+ b/l biceps, 2+ triceps, 2 + brachioradialis, 2+ patellar, and 2+ ankle reflexes. Plantar responses were flexor bilaterally. No clonus, Graf's or crossed adductor response.\par Coord: Normal finger to nose testing and rapid alternating movements.\par Gait: Normal gait. He is able to walk on heels, toes, and tandem without difficulty. He is steady with single leg and tandem balance testing.\par \par

## 2022-01-13 NOTE — HISTORY OF PRESENT ILLNESS
[FreeTextEntry1] : Mr. EMMA FLORES is a 51 year RH male Pmhx DVT and PE x 2 on anticoagulation with Xarelto, s/p concussion 25 years ago with persistent daily headaches who is here for evaluation of headaches .  He has had constant daily headaches for the last 25 years.  At baseline he reports mild pain 3/10 dull pain on either or both sides of the head associated with sens to light and noise, no nausea , denies aura. This past Sunday, he bumped his head on the wall while walking down steps and had increased headache from 3/10-7/10 since.  He takes Tylenol for his more severe headaches. Apply heat to neck and stretching help , + neck stiffness\par \par Prior meds: He has failed multiple nmeds includiing Topamax, Depakote, Trokendi, \par Current Meds: Xarelto, Cialis , Prilosec ( dc) \par \par Social hx: He is  and has 2 children 6 y/o and 3 y/o and lives in [place].  He is working in IT.  He does not smoke,  He has 2-3 drinks twice per week, denies illicits.  \par \par \par

## 2022-01-13 NOTE — ASSESSMENT
[FreeTextEntry1] : Mr. EMMA FLORES is a 51 year RH male Pmhx DVT and PE x 2 on anticoagulation with Xarelto, s/p concussion 25 years ago with persistent daily headaches. He is a candidate for preventtive therapy including Qulipta, Nurtec or BOTOX.\par \par Patient will think about these treatments and get back to me if nec\par \par \par \par

## 2022-01-14 ENCOUNTER — TRANSCRIPTION ENCOUNTER (OUTPATIENT)
Age: 52
End: 2022-01-14

## 2022-01-24 ENCOUNTER — APPOINTMENT (OUTPATIENT)
Dept: PULMONOLOGY | Facility: CLINIC | Age: 52
End: 2022-01-24

## 2022-02-03 ENCOUNTER — APPOINTMENT (OUTPATIENT)
Dept: PAIN MANAGEMENT | Facility: CLINIC | Age: 52
End: 2022-02-03

## 2022-03-31 ENCOUNTER — NON-APPOINTMENT (OUTPATIENT)
Age: 52
End: 2022-03-31

## 2022-03-31 LAB — SARS-COV-2 N GENE NPH QL NAA+PROBE: DETECTED

## 2022-04-01 ENCOUNTER — NON-APPOINTMENT (OUTPATIENT)
Age: 52
End: 2022-04-01

## 2022-04-06 ENCOUNTER — TRANSCRIPTION ENCOUNTER (OUTPATIENT)
Age: 52
End: 2022-04-06

## 2022-04-06 ENCOUNTER — RX RENEWAL (OUTPATIENT)
Age: 52
End: 2022-04-06

## 2022-04-07 ENCOUNTER — APPOINTMENT (OUTPATIENT)
Dept: PULMONOLOGY | Facility: CLINIC | Age: 52
End: 2022-04-07
Payer: COMMERCIAL

## 2022-04-07 PROCEDURE — 99213 OFFICE O/P EST LOW 20 MIN: CPT | Mod: 95

## 2022-04-07 NOTE — DISCUSSION/SUMMARY
[FreeTextEntry1] : COVID-19 infection\par \par History of pulmonary embolism\par Obstructive sleep apnea\par Hyperlipidemia\par \par Recovery from Covid ongoing with minimal residual cough\par Office follow-up chest x-ray PFT COVID bundle repeat blood work

## 2022-04-07 NOTE — HISTORY OF PRESENT ILLNESS
[Home] : at home, [unfilled] , at the time of the visit. [Medical Office: ([facility])___] : at the medical office located in  [Verbal consent obtained from patient] : the patient, [unfilled] [Never] : never [TextBox_4] : Post COVID-19 infection\par Day #9\par O2 saturations high 90% range\par Did have a cough and was treated with Medrol Dosepak with interval improvement although still some residual cough\par No chest pain chest tightness shortness of breath\par No purulent sputum\par No fevers chills or sweats\par

## 2022-04-13 ENCOUNTER — APPOINTMENT (OUTPATIENT)
Dept: PULMONOLOGY | Facility: CLINIC | Age: 52
End: 2022-04-13

## 2022-04-13 ENCOUNTER — APPOINTMENT (OUTPATIENT)
Dept: PULMONOLOGY | Facility: CLINIC | Age: 52
End: 2022-04-13
Payer: COMMERCIAL

## 2022-04-13 VITALS
HEART RATE: 87 BPM | TEMPERATURE: 98 F | RESPIRATION RATE: 16 BRPM | OXYGEN SATURATION: 96 % | DIASTOLIC BLOOD PRESSURE: 85 MMHG | SYSTOLIC BLOOD PRESSURE: 126 MMHG

## 2022-04-13 PROCEDURE — 99214 OFFICE O/P EST MOD 30 MIN: CPT | Mod: 25

## 2022-04-13 PROCEDURE — 71046 X-RAY EXAM CHEST 2 VIEWS: CPT

## 2022-04-13 PROCEDURE — 36415 COLL VENOUS BLD VENIPUNCTURE: CPT

## 2022-04-13 NOTE — PROCEDURE
[FreeTextEntry1] : Chest x-ray PA lateral April 13, 2022\par Cardiac size normal\par Lung fields are clear\par No parenchymal infiltrates pleural effusions or dominant pulmonary nodules\par Soft tissue bony structures unremarkable\par No evidence for COVID pneumonia\par \par Chest x-ray PA lateral April 15, 2021\par Cardiac size abnormal\par Clear lung fields\par No parenchymal infiltrates pleural effusions or dominant pulmonary nodules\par Soft tissue bony structures unremarkable\par Comparison to prior chest x-rays without interval change\par \par EKG 4/15/21\par  NSR\par \par Chest x-ray PA lateral March 15, 2021\par Normal cardiac size\par No parenchymal infiltrates pleural effusions or dominant pulmonary nodules\par Soft tissue bony structures unremarkable\par Impression clear lungs\par \par PFT without bronchodilator December 23, 2019\par Flow rates normal\par Lung volumes total capacity 77% predicted minimally reduced\par Mild reduction diffusion 66% of predicted.\par Hemoglobin 14.7\par \par Flu vaccine IM 9/26/2019\par INR  1.2 off coumadin\par \par Blood draw\par \par Echocardiogram April 22, 2019\par Normal left ventricle normal right ventricle no evidence of pulmonary hypertension

## 2022-04-13 NOTE — HISTORY OF PRESENT ILLNESS
[Never] : never [TextBox_4] : \par Post COVID-19 infection\par Day # 14\par O2 saturations high 90% range\par Did have a cough and was treated with Medrol Dosepak with interval improvement although still some residual cough\par No chest pain chest tightness shortness of breath\par No purulent sputum\par No fevers chills or sweats\par  \par states  prior EKG with  cardio Shree Waller MD \par Ca score 0\par carotids  neg\par recommended to hold lipid  lowering agents\par weight 240 post  15 ib weight loss\par \par \par Status: The patient reports his symptoms are stable since the last visit. \par Interval Symptoms: denies dyspnea on exertion, denies exercise intolerance, denies coughing, denies wheezing, denies lower extremity edema, denies chest pain, denies fever. \par \par The patient is a 49 year old male with a history of obstructive sleep apnea. \par Diagnostic Polysomnogram:. Date of most recent diagnostic polysomnogram: 1/20/2016. \par Mild PACO Not on active treatment. \par Complaint states onset past Sunday developed GI symptomatology with abdominal cramping diarrhea\par No reported nausea\par No reported fever\par No's sweats or chills\par No urinary symptomatology\par No localized abdominal pain noted\par Denies any burping indigestion or reflux\par No recent travel\par Ho hx COVID

## 2022-04-13 NOTE — DISCUSSION/SUMMARY
[FreeTextEntry1] : COVID 19 Infection \par GERD Aty CP with incomplete response to PPI- resolved with tx\par recurrent PE\par  Xarelto- 10 mg QD \par f/u 1  month\par cardiology consult with f/u noted  verbal report noted\par Borderline hypertension\par Recheck cholesterol\par Weight loss\par Readdress issue of obstructive sleep apnea-he did complete a sleep study January 20 and 21, 2016 with an overall AHI of 5\par PFIZER Covid Vaccine  completed  x 2 doses with advised COVID  booster\par \par  cont prilosec but add short term Pepcid 40  mg- OFF\par flu shot up  to  date - \par f/u ;ipid 3  konth \par f/u PFT 3  months' cont weight loss \par

## 2022-04-14 ENCOUNTER — NON-APPOINTMENT (OUTPATIENT)
Age: 52
End: 2022-04-14

## 2022-04-14 LAB
ALBUMIN SERPL ELPH-MCNC: 4.6 G/DL
ALP BLD-CCNC: 54 U/L
ALT SERPL-CCNC: 16 U/L
ANION GAP SERPL CALC-SCNC: 15 MMOL/L
AST SERPL-CCNC: 16 U/L
BASOPHILS # BLD AUTO: 0.04 K/UL
BASOPHILS NFR BLD AUTO: 0.5 %
BILIRUB SERPL-MCNC: 0.4 MG/DL
BUN SERPL-MCNC: 29 MG/DL
CALCIUM SERPL-MCNC: 10.4 MG/DL
CHLORIDE SERPL-SCNC: 105 MMOL/L
CHOLEST SERPL-MCNC: 180 MG/DL
CO2 SERPL-SCNC: 24 MMOL/L
CREAT SERPL-MCNC: 1.12 MG/DL
CRP SERPL-MCNC: 6 MG/L
DEPRECATED D DIMER PPP IA-ACNC: <150 NG/ML DDU
EGFR: 80 ML/MIN/1.73M2
EOSINOPHIL # BLD AUTO: 0.11 K/UL
EOSINOPHIL NFR BLD AUTO: 1.4 %
FERRITIN SERPL-MCNC: 158 NG/ML
GLUCOSE SERPL-MCNC: 75 MG/DL
HCT VFR BLD CALC: 47.9 %
HDLC SERPL-MCNC: 62 MG/DL
HGB BLD-MCNC: 15.2 G/DL
IMM GRANULOCYTES NFR BLD AUTO: 0.8 %
LDLC SERPL CALC-MCNC: 87 MG/DL
LYMPHOCYTES # BLD AUTO: 2.68 K/UL
LYMPHOCYTES NFR BLD AUTO: 34.6 %
MAN DIFF?: NORMAL
MCHC RBC-ENTMCNC: 31.7 GM/DL
MCHC RBC-ENTMCNC: 32 PG
MCV RBC AUTO: 100.8 FL
MONOCYTES # BLD AUTO: 0.74 K/UL
MONOCYTES NFR BLD AUTO: 9.6 %
NEUTROPHILS # BLD AUTO: 4.11 K/UL
NEUTROPHILS NFR BLD AUTO: 53.1 %
NONHDLC SERPL-MCNC: 119 MG/DL
PLATELET # BLD AUTO: 309 K/UL
POTASSIUM SERPL-SCNC: 5.2 MMOL/L
PROCALCITONIN SERPL-MCNC: 0.04 NG/ML
PROT SERPL-MCNC: 7.5 G/DL
RBC # BLD: 4.75 M/UL
RBC # FLD: 12.6 %
SODIUM SERPL-SCNC: 145 MMOL/L
TRIGL SERPL-MCNC: 158 MG/DL
WBC # FLD AUTO: 7.74 K/UL

## 2022-05-25 ENCOUNTER — APPOINTMENT (OUTPATIENT)
Dept: PULMONOLOGY | Facility: CLINIC | Age: 52
End: 2022-05-25
Payer: COMMERCIAL

## 2022-05-25 VITALS
HEART RATE: 84 BPM | TEMPERATURE: 98.3 F | WEIGHT: 250 LBS | OXYGEN SATURATION: 99 % | RESPIRATION RATE: 16 BRPM | SYSTOLIC BLOOD PRESSURE: 118 MMHG | BODY MASS INDEX: 33.86 KG/M2 | HEIGHT: 72 IN | DIASTOLIC BLOOD PRESSURE: 82 MMHG

## 2022-05-25 PROCEDURE — 94010 BREATHING CAPACITY TEST: CPT

## 2022-05-25 PROCEDURE — ZZZZZ: CPT

## 2022-05-25 PROCEDURE — 94729 DIFFUSING CAPACITY: CPT

## 2022-05-25 PROCEDURE — 94727 GAS DIL/WSHOT DETER LNG VOL: CPT

## 2022-05-25 PROCEDURE — 99214 OFFICE O/P EST MOD 30 MIN: CPT | Mod: 25

## 2022-05-25 NOTE — DISCUSSION/SUMMARY
[FreeTextEntry1] : COVID 19 Infection \par stable pulmonary physiology\par GERD Aty CP with incomplete response to PPI- resolved with tx\par recurrent PE\par  Xarelto- 10 mg QD \par f/u 1  month\par cardiology consult with f/u noted  verbal report noted\par Borderline hypertension\par Recheck cholesterol\par Weight loss\par Readdress issue of obstructive sleep apnea-he did complete a sleep study January 20 and 21, 2016 with an overall AHI of 5\par PFIZER Covid Vaccine  completed  x 2 doses with advised COVID  booster\par \par  cont prilosec but add short term Pepcid 40  mg- OFF\par flu shot up  to  date - \par watch lipid profile\par f/u PFT 3  months' cont weight loss \par

## 2022-05-25 NOTE — PROCEDURE
[FreeTextEntry1] : PFT 5/25/22\par flow rates nl\par  Lung  volumes nl\par  DLCO 80 % WNL\par  HGB 15.2\par \par Chest x-ray PA lateral April 13, 2022\par Cardiac size normal\par Lung fields are clear\par No parenchymal infiltrates pleural effusions or dominant pulmonary nodules\par Soft tissue bony structures unremarkable\par No evidence for COVID pneumonia\par \par Chest x-ray PA lateral April 15, 2021\par Cardiac size abnormal\par Clear lung fields\par No parenchymal infiltrates pleural effusions or dominant pulmonary nodules\par Soft tissue bony structures unremarkable\par Comparison to prior chest x-rays without interval change\par \par EKG 4/15/21\par  NSR\par \par Chest x-ray PA lateral March 15, 2021\par Normal cardiac size\par No parenchymal infiltrates pleural effusions or dominant pulmonary nodules\par Soft tissue bony structures unremarkable\par Impression clear lungs\par \par PFT without bronchodilator December 23, 2019\par Flow rates normal\par Lung volumes total capacity 77% predicted minimally reduced\par Mild reduction diffusion 66% of predicted.\par Hemoglobin 14.7\par \par Blood draw\par \par Echocardiogram April 22, 2019\par Normal left ventricle normal right ventricle no evidence of pulmonary hypertension

## 2022-09-09 ENCOUNTER — APPOINTMENT (OUTPATIENT)
Dept: PULMONOLOGY | Facility: CLINIC | Age: 52
End: 2022-09-09

## 2022-09-09 DIAGNOSIS — U07.1 COVID-19: ICD-10-CM

## 2022-09-09 PROCEDURE — 99213 OFFICE O/P EST LOW 20 MIN: CPT | Mod: 25

## 2022-09-09 PROCEDURE — G0008: CPT

## 2022-09-09 PROCEDURE — 90686 IIV4 VACC NO PRSV 0.5 ML IM: CPT

## 2022-09-09 NOTE — PROCEDURE
[FreeTextEntry1] : PFT 5/25/22\par flow rates nl\par  Lung  volumes nl\par  DLCO 80 % WNL\par  HGB 15.2\par \par Chest x-ray PA lateral April 13, 2022\par Cardiac size normal\par Lung fields are clear\par No parenchymal infiltrates pleural effusions or dominant pulmonary nodules\par Soft tissue bony structures unremarkable\par No evidence for COVID pneumonia\par \par Chest x-ray PA lateral April 15, 2021\par Cardiac size abnormal\par Clear lung fields\par No parenchymal infiltrates pleural effusions or dominant pulmonary nodules\par Soft tissue bony structures unremarkable\par Comparison to prior chest x-rays without interval change\par \par EKG 4/15/21\par  NSR\par \par Chest x-ray PA lateral March 15, 2021\par Normal cardiac size\par No parenchymal infiltrates pleural effusions or dominant pulmonary nodules\par Soft tissue bony structures unremarkable\par Impression clear lungs\par \par PFT without bronchodilator December 23, 2019\par Flow rates normal\par Lung volumes total capacity 77% predicted minimally reduced\par Mild reduction diffusion 66% of predicted.\par Hemoglobin 14.7\par \par Blood draw\par \par Echocardiogram April 22, 2019\par Normal left ventricle normal right ventricle no evidence of pulmonary hypertension\par \par Flu vaccination administered 9/9/2022

## 2022-10-17 ENCOUNTER — EMERGENCY (EMERGENCY)
Facility: HOSPITAL | Age: 52
LOS: 1 days | Discharge: HOME | End: 2022-10-17
Attending: EMERGENCY MEDICINE | Admitting: EMERGENCY MEDICINE

## 2022-10-17 VITALS
HEIGHT: 72 IN | OXYGEN SATURATION: 99 % | RESPIRATION RATE: 18 BRPM | TEMPERATURE: 98 F | HEART RATE: 78 BPM | DIASTOLIC BLOOD PRESSURE: 97 MMHG | SYSTOLIC BLOOD PRESSURE: 137 MMHG

## 2022-10-17 PROCEDURE — 99285 EMERGENCY DEPT VISIT HI MDM: CPT

## 2022-10-17 RX ORDER — FAMOTIDINE 10 MG/ML
20 INJECTION INTRAVENOUS DAILY
Refills: 0 | Status: DISCONTINUED | OUTPATIENT
Start: 2022-10-17 | End: 2022-10-21

## 2022-10-17 RX ORDER — PANTOPRAZOLE SODIUM 20 MG/1
40 TABLET, DELAYED RELEASE ORAL
Refills: 0 | Status: DISCONTINUED | OUTPATIENT
Start: 2022-10-17 | End: 2022-10-21

## 2022-10-17 RX ORDER — SUCRALFATE 1 G
1 TABLET ORAL ONCE
Refills: 0 | Status: COMPLETED | OUTPATIENT
Start: 2022-10-17 | End: 2022-10-17

## 2022-10-17 RX ADMIN — PANTOPRAZOLE SODIUM 40 MILLIGRAM(S): 20 TABLET, DELAYED RELEASE ORAL at 22:12

## 2022-10-17 RX ADMIN — FAMOTIDINE 20 MILLIGRAM(S): 10 INJECTION INTRAVENOUS at 22:12

## 2022-10-17 RX ADMIN — Medication 1 GRAM(S): at 22:12

## 2022-10-17 NOTE — ED PROVIDER NOTE - PATIENT PORTAL LINK FT
You can access the FollowMyHealth Patient Portal offered by [facility] by registering at the following website: [email or web address]. By joining [employer]’s FollowMyHealth portal, you will also be able to view your health information using other applications (apps) compatible with our system.

## 2022-10-17 NOTE — ED PROVIDER NOTE - ATTENDING CONTRIBUTION TO CARE
Last Visit: 8/22/19 with MD Gina Bolden  Next Appointment: return in 6-12 months  Previous Refill Encounter(s): 8/22/19 #60    Requested Prescriptions     Pending Prescriptions Disp Refills    LORazepam (Ativan) 0.5 mg tablet 60 Tab 0     Sig: Take 1 Tab by mouth two (2) times daily as needed for Anxiety. Max Daily Amount: 1 mg. I performed a history and physical exam of the patient and discussed their management with the resident. I reviewed the resident's note and agree with the documented findings and plan of care. My medical decision making and observations are found above.

## 2022-10-17 NOTE — ED PROVIDER NOTE - PROGRESS NOTE DETAILS
Mark GU (PGY-3)  labs nonactionable, cxr clear. pt overall feels better after meds. will d/c pt to home with pcp f/u and return precautions

## 2022-10-17 NOTE — ED PROVIDER NOTE - OBJECTIVE STATEMENT
52 Y M H/o GERD, on xeralto, p/w chest pain. States 3 days of intermittent chest pain, no improvement with GI cocktail at home (pepcid, maalox), no exertional quality. States pain is consistent with prior GERD, experienced 2 years ago, which improved with PPI use, recently MN'ed in july as symptoms improved after weight loss, regained with with return of symptomsdenies any SOB, pleuritic pain, no nausea, vomiting, leg swelling, syncope.

## 2022-10-17 NOTE — ED PROVIDER NOTE - NSFOLLOWUPINSTRUCTIONS_ED_ALL_ED_FT
Gastritis    Gastritis is soreness and swelling (inflammation) of the lining of the stomach. Gastritis can develop as a sudden onset (acute) or long-term (chronic) condition. If gastritis is not treated, it can lead to stomach bleeding and ulcers. Causes include viral and bacterial infections, excessive alcohol consumption, tobacco use, or certain medications. Symptoms include nausea, vomiting, or abdominal pain or burning especially after eating. Avoid foods or drinks that make your symptoms worse such as caffeine, chocolate, spicy foods, acidic foods, or alcohol.    See your primary care doctor in 2-3 days for further evaluation of your symptoms    SEEK IMMEDIATE MEDICAL CARE IF YOU HAVE ANY OF THE FOLLOWING SYMPTOMS: black or bloody stools, blood or coffee-ground-colored vomitus, worsening abdominal pain, fever, or inability to keep fluids down.

## 2022-10-17 NOTE — ED PROVIDER NOTE - CLINICAL SUMMARY MEDICAL DECISION MAKING FREE TEXT BOX
Uriel att: WIll perform labs, meds, xray, ecg. The pt describes this pain as similar to the pain that she experiences with her GERD but will send troponin and reassess.

## 2022-10-17 NOTE — ED ADULT NURSE NOTE - OBJECTIVE STATEMENT
53y/o M with Hx of GERD presents to ED with c/o CP. Pt states that he 51y/o M presents to ED with c/o CP. He reports prior Hx of GERD, which he states he has recently stopped taking meds for. Pt states that his pain is consistent with prior GERD episodes. Denies n/v/d, fever, chills, SOB, cough. Medicated as per MD order. Will continue to monitor.

## 2022-10-18 ENCOUNTER — NON-APPOINTMENT (OUTPATIENT)
Age: 52
End: 2022-10-18

## 2022-10-18 LAB
ALBUMIN SERPL ELPH-MCNC: 4.8 G/DL — SIGNIFICANT CHANGE UP (ref 3.3–5)
ALP SERPL-CCNC: 37 U/L — LOW (ref 40–120)
ALT FLD-CCNC: 17 U/L — SIGNIFICANT CHANGE UP (ref 4–41)
ANION GAP SERPL CALC-SCNC: 11 MMOL/L — SIGNIFICANT CHANGE UP (ref 7–14)
AST SERPL-CCNC: 22 U/L — SIGNIFICANT CHANGE UP (ref 4–40)
BASOPHILS # BLD AUTO: 0.03 K/UL — SIGNIFICANT CHANGE UP (ref 0–0.2)
BASOPHILS NFR BLD AUTO: 0.4 % — SIGNIFICANT CHANGE UP (ref 0–2)
BILIRUB SERPL-MCNC: 0.6 MG/DL — SIGNIFICANT CHANGE UP (ref 0.2–1.2)
BUN SERPL-MCNC: 20 MG/DL — SIGNIFICANT CHANGE UP (ref 7–23)
CALCIUM SERPL-MCNC: 10 MG/DL — SIGNIFICANT CHANGE UP (ref 8.4–10.5)
CHLORIDE SERPL-SCNC: 104 MMOL/L — SIGNIFICANT CHANGE UP (ref 98–107)
CO2 SERPL-SCNC: 26 MMOL/L — SIGNIFICANT CHANGE UP (ref 22–31)
CREAT SERPL-MCNC: 1.02 MG/DL — SIGNIFICANT CHANGE UP (ref 0.5–1.3)
EGFR: 89 ML/MIN/1.73M2 — SIGNIFICANT CHANGE UP
EOSINOPHIL # BLD AUTO: 0.13 K/UL — SIGNIFICANT CHANGE UP (ref 0–0.5)
EOSINOPHIL NFR BLD AUTO: 1.7 % — SIGNIFICANT CHANGE UP (ref 0–6)
GLUCOSE SERPL-MCNC: 90 MG/DL — SIGNIFICANT CHANGE UP (ref 70–99)
HCT VFR BLD CALC: 45.2 % — SIGNIFICANT CHANGE UP (ref 39–50)
HGB BLD-MCNC: 15.1 G/DL — SIGNIFICANT CHANGE UP (ref 13–17)
IANC: 3.71 K/UL — SIGNIFICANT CHANGE UP (ref 1.8–7.4)
IMM GRANULOCYTES NFR BLD AUTO: 0.3 % — SIGNIFICANT CHANGE UP (ref 0–0.9)
LYMPHOCYTES # BLD AUTO: 3.1 K/UL — SIGNIFICANT CHANGE UP (ref 1–3.3)
LYMPHOCYTES # BLD AUTO: 41 % — SIGNIFICANT CHANGE UP (ref 13–44)
MCHC RBC-ENTMCNC: 33 PG — SIGNIFICANT CHANGE UP (ref 27–34)
MCHC RBC-ENTMCNC: 33.4 GM/DL — SIGNIFICANT CHANGE UP (ref 32–36)
MCV RBC AUTO: 98.7 FL — SIGNIFICANT CHANGE UP (ref 80–100)
MONOCYTES # BLD AUTO: 0.58 K/UL — SIGNIFICANT CHANGE UP (ref 0–0.9)
MONOCYTES NFR BLD AUTO: 7.7 % — SIGNIFICANT CHANGE UP (ref 2–14)
NEUTROPHILS # BLD AUTO: 3.71 K/UL — SIGNIFICANT CHANGE UP (ref 1.8–7.4)
NEUTROPHILS NFR BLD AUTO: 48.9 % — SIGNIFICANT CHANGE UP (ref 43–77)
NRBC # BLD: 0 /100 WBCS — SIGNIFICANT CHANGE UP (ref 0–0)
NRBC # FLD: 0 K/UL — SIGNIFICANT CHANGE UP (ref 0–0)
PLATELET # BLD AUTO: 222 K/UL — SIGNIFICANT CHANGE UP (ref 150–400)
POTASSIUM SERPL-MCNC: 4.5 MMOL/L — SIGNIFICANT CHANGE UP (ref 3.5–5.3)
POTASSIUM SERPL-SCNC: 4.5 MMOL/L — SIGNIFICANT CHANGE UP (ref 3.5–5.3)
PROT SERPL-MCNC: 8.1 G/DL — SIGNIFICANT CHANGE UP (ref 6–8.3)
RBC # BLD: 4.58 M/UL — SIGNIFICANT CHANGE UP (ref 4.2–5.8)
RBC # FLD: 12 % — SIGNIFICANT CHANGE UP (ref 10.3–14.5)
SODIUM SERPL-SCNC: 141 MMOL/L — SIGNIFICANT CHANGE UP (ref 135–145)
TROPONIN T, HIGH SENSITIVITY RESULT: <6 NG/L — SIGNIFICANT CHANGE UP
WBC # BLD: 7.57 K/UL — SIGNIFICANT CHANGE UP (ref 3.8–10.5)
WBC # FLD AUTO: 7.57 K/UL — SIGNIFICANT CHANGE UP (ref 3.8–10.5)

## 2022-10-18 PROCEDURE — 71046 X-RAY EXAM CHEST 2 VIEWS: CPT | Mod: 26

## 2022-10-19 ENCOUNTER — APPOINTMENT (OUTPATIENT)
Dept: PULMONOLOGY | Facility: CLINIC | Age: 52
End: 2022-10-19

## 2022-11-23 ENCOUNTER — APPOINTMENT (OUTPATIENT)
Dept: PULMONOLOGY | Facility: CLINIC | Age: 52
End: 2022-11-23

## 2022-11-23 VITALS
RESPIRATION RATE: 17 BRPM | OXYGEN SATURATION: 99 % | DIASTOLIC BLOOD PRESSURE: 81 MMHG | HEART RATE: 98 BPM | SYSTOLIC BLOOD PRESSURE: 116 MMHG

## 2022-11-23 DIAGNOSIS — J10.1 INFLUENZA DUE TO OTHER IDENTIFIED INFLUENZA VIRUS WITH OTHER RESPIRATORY MANIFESTATIONS: ICD-10-CM

## 2022-11-23 PROCEDURE — 99214 OFFICE O/P EST MOD 30 MIN: CPT

## 2022-11-23 RX ORDER — METHOCARBAMOL 500 MG/1
500 TABLET, FILM COATED ORAL
Qty: 14 | Refills: 0 | Status: DISCONTINUED | COMMUNITY
Start: 2021-09-24 | End: 2022-11-23

## 2022-11-23 RX ORDER — FAMOTIDINE 40 MG/1
40 TABLET, FILM COATED ORAL
Qty: 30 | Refills: 0 | Status: DISCONTINUED | COMMUNITY
Start: 2022-03-31 | End: 2022-11-23

## 2022-11-23 RX ORDER — FAMOTIDINE 40 MG/1
40 TABLET, FILM COATED ORAL
Qty: 30 | Refills: 0 | Status: DISCONTINUED | COMMUNITY
Start: 2021-04-15 | End: 2022-11-23

## 2022-11-23 RX ORDER — METHYLPREDNISOLONE 4 MG/1
4 TABLET ORAL
Qty: 1 | Refills: 0 | Status: DISCONTINUED | COMMUNITY
Start: 2022-04-06 | End: 2022-11-23

## 2022-11-23 RX ORDER — CYCLOBENZAPRINE HYDROCHLORIDE 10 MG/1
10 TABLET, FILM COATED ORAL
Qty: 6 | Refills: 0 | Status: DISCONTINUED | COMMUNITY
Start: 2021-09-05 | End: 2022-11-23

## 2022-11-23 RX ORDER — METAXALONE 800 MG/1
800 TABLET ORAL 3 TIMES DAILY
Qty: 21 | Refills: 0 | Status: DISCONTINUED | COMMUNITY
Start: 2021-09-09 | End: 2022-11-23

## 2022-11-23 RX ORDER — DEXLANSOPRAZOLE 60 MG/1
60 CAPSULE, DELAYED RELEASE ORAL DAILY
Qty: 30 | Refills: 3 | Status: DISCONTINUED | OUTPATIENT
Start: 2018-10-17 | End: 2022-11-23

## 2022-11-23 NOTE — PHYSICAL EXAM
[General Appearance - Well Developed] : well developed [General Appearance - Well Nourished] : well nourished [Normal Conjunctiva] : the conjunctiva exhibited no abnormalities [Normal Oropharynx] : normal oropharynx [Neck Appearance] : the appearance of the neck was normal [Neck Cervical Mass (___cm)] : no neck mass was observed [Thyroid Diffuse Enlargement] : the thyroid was not enlarged [Jugular Venous Distention Increased] : there was no jugular-venous distention [Cyanosis, Localized] : no localized cyanosis [] : no ischemic changes [Skin Color & Pigmentation] : normal skin color and pigmentation [No Venous Stasis] : no venous stasis [Skin Lesions] : no skin lesions [No Skin Ulcers] : no skin ulcer [No Xanthoma] : no  xanthoma was observed [Sensation] : the sensory exam was normal to light touch and pinprick [No Focal Deficits] : no focal deficits

## 2022-11-23 NOTE — HISTORY OF PRESENT ILLNESS
[Never] : never [TextBox_4] : Post Influenza A\par inc GERD sxs but was off PPI\par \par Post COVID-19 infection\par Day # 14\par O2 saturations high 90% range\par Did have a cough and was treated with Medrol Dosepak with interval improvement although still some residual cough\par No chest pain chest tightness shortness of breath\par No purulent sputum\par No fevers chills or sweats\par  \par states  prior EKG with  cardio Shree Waller MD \par Ca score 0\par carotids  neg\par recommended to hold lipid  lowering agents\par weight 240 post  15 ib weight loss\par \par \par Status: The patient reports his symptoms are stable since the last visit. \par Interval Symptoms: denies dyspnea on exertion, denies exercise intolerance, denies coughing, denies wheezing, denies lower extremity edema, denies chest pain, denies fever. \par \par The patient is a 49 year old male with a history of obstructive sleep apnea. \par Diagnostic Polysomnogram:. Date of most recent diagnostic polysomnogram: 1/20/2016. \par Mild PACO Not on active treatment. \par Complaint states onset past Sunday developed GI symptomatology with abdominal cramping diarrhea\par No reported nausea\par No reported fever\par No's sweats or chills\par No urinary symptomatology\par No localized abdominal pain noted\par Denies any burping indigestion or reflux\par No recent travel\par Ho hx COVID

## 2022-11-23 NOTE — PROCEDURE
[FreeTextEntry1] : PFT 5/25/22\par flow rates nl\par  Lung  volumes nl\par  DLCO 80 % WNL\par  HGB 15.2\par \par Chest x-ray PA lateral April 13, 2022\par Cardiac size normal\par Lung fields are clear\par No parenchymal infiltrates pleural effusions or dominant pulmonary nodules\par Soft tissue bony structures unremarkable\par No evidence for COVID pneumonia\par \par Chest x-ray PA lateral April 15, 2021\par Cardiac size abnormal\par Clear lung fields\par No parenchymal infiltrates pleural effusions or dominant pulmonary nodules\par Soft tissue bony structures unremarkable\par Comparison to prior chest x-rays without interval change\par \par EKG 4/15/21\par  NSR\par \par Chest x-ray PA lateral March 15, 2021\par Normal cardiac size\par No parenchymal infiltrates pleural effusions or dominant pulmonary nodules\par Soft tissue bony structures unremarkable\par Impression clear lungs\par \par PFT without bronchodilator December 23, 2019\par Flow rates normal\par Lung volumes total capacity 77% predicted minimally reduced\par Mild reduction diffusion 66% of predicted.\par Hemoglobin 14.7\par \par Echocardiogram April 22, 2019\par Normal left ventricle normal right ventricle no evidence of pulmonary hypertension\par \par Flu vaccination administered 9/9/2022

## 2022-11-23 NOTE — DISCUSSION/SUMMARY
[FreeTextEntry1] : recurrent GERD sxs- GI is pending RS Prilosec\par Post Influenza A\par COVID 19 Infection \par stable pulmonary physiology\par GERD Aty CP with incomplete response to PPI- resolved with tx\par recurrent PE\par  Xarelto- 10 mg QD \par f/u 1  month\par cardiology consult with f/u noted  verbal report noted\par Borderline hypertension\par Recheck cholesterol\par Weight loss\par Readdress issue of obstructive sleep apnea-he did complete a sleep study January 20 and 21, 2016 with an overall AHI of 5\par PFIZER Covid Vaccine  completed  x 2 doses with advised COVID  booster\par \par  cont prilosec but add short term Pepcid 40  mg- OFF\par flu shot up  to  date - \par watch lipid profile\par f/u PFT 3  months' cont weight loss \par

## 2022-11-28 ENCOUNTER — APPOINTMENT (OUTPATIENT)
Dept: GASTROENTEROLOGY | Facility: CLINIC | Age: 52
End: 2022-11-28

## 2022-11-28 VITALS
WEIGHT: 255 LBS | OXYGEN SATURATION: 97 % | HEART RATE: 90 BPM | DIASTOLIC BLOOD PRESSURE: 88 MMHG | HEIGHT: 72 IN | BODY MASS INDEX: 34.54 KG/M2 | TEMPERATURE: 97.5 F | SYSTOLIC BLOOD PRESSURE: 124 MMHG

## 2022-11-28 DIAGNOSIS — Z01.818 ENCOUNTER FOR OTHER PREPROCEDURAL EXAMINATION: ICD-10-CM

## 2022-11-28 DIAGNOSIS — D12.6 BENIGN NEOPLASM OF COLON, UNSPECIFIED: ICD-10-CM

## 2022-11-28 PROCEDURE — 99205 OFFICE O/P NEW HI 60 MIN: CPT

## 2022-11-28 RX ORDER — OSELTAMIVIR PHOSPHATE 75 MG/1
75 CAPSULE ORAL TWICE DAILY
Qty: 1 | Refills: 0 | Status: DISCONTINUED | COMMUNITY
Start: 2022-11-11 | End: 2022-11-28

## 2022-11-28 RX ORDER — AMITRIPTYLINE HYDROCHLORIDE 10 MG/1
10 TABLET, FILM COATED ORAL
Qty: 30 | Refills: 0 | Status: DISCONTINUED | COMMUNITY
Start: 2022-10-24 | End: 2022-11-28

## 2022-11-28 RX ORDER — PROPRANOLOL HYDROCHLORIDE 10 MG/1
10 TABLET ORAL
Qty: 30 | Refills: 0 | Status: DISCONTINUED | COMMUNITY
Start: 2022-02-06 | End: 2022-11-28

## 2022-11-28 RX ORDER — RIMEGEPANT SULFATE 75 MG/75MG
75 TABLET, ORALLY DISINTEGRATING ORAL
Qty: 16 | Refills: 3 | Status: DISCONTINUED | COMMUNITY
Start: 2022-01-14 | End: 2022-11-28

## 2022-11-28 RX ORDER — SODIUM SULFATE, POTASSIUM SULFATE, MAGNESIUM SULFATE 17.5; 3.13; 1.6 G/ML; G/ML; G/ML
17.5-3.13-1.6 SOLUTION, CONCENTRATE ORAL
Qty: 1 | Refills: 0 | Status: DISCONTINUED | COMMUNITY
Start: 2018-10-17 | End: 2022-11-28

## 2022-11-28 NOTE — REVIEW OF SYSTEMS
[Chest Pain] : chest pain [As Noted in HPI] : as noted in HPI [Negative] : Endocrine [de-identified] : chronic anticoagulant use

## 2022-11-28 NOTE — HISTORY OF PRESENT ILLNESS
[FreeTextEntry1] : Patient is a 52-year-old gentleman who had a bout of chest pain after drinking coffee in the evening.  The pain radiated to both arms.  On 1017, he presented to the emergency room and had a cardiogram, cardiac enzymes, and a chest x-ray.  These were normal.  Patient recently started on omeprazole and his symptoms slowly subsided.  The symptoms had lasted for about 3 weeks.  He had no diaphoresis or shortness of breath.  Blood work in the emergency room revealed normal LFTs.  H/H was 15/45.\par He had a similar episode in 2018.  He had lost about 20 pounds after that.  Omeprazole helped at that time.  He had an upper endoscopy that was essentially normal except for a patulous LES.\par \par Patient's bowel movements are regular.  He does have a history of colon polyps that were tubular adenomas.\par \par Patient has a history of DVT and PE many years ago.  He continues to take Xarelto on a regular basis.

## 2022-11-28 NOTE — ASSESSMENT
[FreeTextEntry1] : Patient with atypical chest pain about 1 month ago.  This necessitated a visit to the emergency room.  He has seen a cardiologist previously and has not had any cardiac issues.  He claims his calcium score is 0.  He will continue to take omeprazole 40 mg daily.  He was told to see his cardiologist and will need cardiac clearance prior to endoscopic procedures.\par An abdominal sonogram will be ordered.\par He will be scheduled for an upper endoscopy and colonoscopy.

## 2022-11-28 NOTE — PHYSICAL EXAM
[Alert] : alert [Normal Voice/Communication] : normal voice/communication [Healthy Appearing] : healthy appearing [No Acute Distress] : no acute distress [Sclera] : the sclera and conjunctiva were normal [Hearing Threshold Finger Rub Not Wilkinson] : hearing was normal [Normal Lips/Gums] : the lips and gums were normal [Oropharynx] : the oropharynx was normal [Normal Appearance] : the appearance of the neck was normal [No Neck Mass] : no neck mass was observed [No Respiratory Distress] : no respiratory distress [No Acc Muscle Use] : no accessory muscle use [Respiration, Rhythm And Depth] : normal respiratory rhythm and effort [Auscultation Breath Sounds / Voice Sounds] : lungs were clear to auscultation bilaterally [Heart Rate And Rhythm] : heart rate was normal and rhythm regular [Normal S1, S2] : normal S1 and S2 [Murmurs] : no murmurs [Bowel Sounds] : normal bowel sounds [Abdomen Tenderness] : non-tender [No Masses] : no abdominal mass palpated [Abdomen Soft] : soft [] : no hepatosplenomegaly [No CVA Tenderness] : no CVA  tenderness [No Spinal Tenderness] : no spinal tenderness [Abnormal Walk] : normal gait [Oriented To Time, Place, And Person] : oriented to person, place, and time

## 2022-12-12 ENCOUNTER — OUTPATIENT (OUTPATIENT)
Dept: OUTPATIENT SERVICES | Facility: HOSPITAL | Age: 52
LOS: 1 days | End: 2022-12-12
Payer: COMMERCIAL

## 2022-12-12 ENCOUNTER — APPOINTMENT (OUTPATIENT)
Dept: ULTRASOUND IMAGING | Facility: CLINIC | Age: 52
End: 2022-12-12

## 2022-12-12 DIAGNOSIS — R07.9 CHEST PAIN, UNSPECIFIED: ICD-10-CM

## 2022-12-12 PROCEDURE — 76700 US EXAM ABDOM COMPLETE: CPT

## 2022-12-12 PROCEDURE — 76700 US EXAM ABDOM COMPLETE: CPT | Mod: 26

## 2023-02-13 ENCOUNTER — RX RENEWAL (OUTPATIENT)
Age: 53
End: 2023-02-13

## 2023-03-08 ENCOUNTER — APPOINTMENT (OUTPATIENT)
Dept: PULMONOLOGY | Facility: CLINIC | Age: 53
End: 2023-03-08

## 2023-03-08 ENCOUNTER — APPOINTMENT (OUTPATIENT)
Dept: PULMONOLOGY | Facility: CLINIC | Age: 53
End: 2023-03-08
Payer: COMMERCIAL

## 2023-03-08 VITALS — DIASTOLIC BLOOD PRESSURE: 87 MMHG | HEART RATE: 78 BPM | OXYGEN SATURATION: 98 % | SYSTOLIC BLOOD PRESSURE: 132 MMHG

## 2023-03-08 DIAGNOSIS — D68.9 COAGULATION DEFECT, UNSPECIFIED: ICD-10-CM

## 2023-03-08 DIAGNOSIS — Z00.00 ENCOUNTER FOR GENERAL ADULT MEDICAL EXAMINATION W/OUT ABNORMAL FINDINGS: ICD-10-CM

## 2023-03-08 LAB
ALBUMIN: 30
BILIRUB UR QL STRIP: NORMAL
CLARITY UR: CLEAR
COLLECTION METHOD: NORMAL
CREATININE: 300
GLUCOSE UR-MCNC: NORMAL
HCG UR QL: 0.2 EU/DL
HGB UR QL STRIP.AUTO: NORMAL
KETONES UR-MCNC: NORMAL
LEUKOCYTE ESTERASE UR QL STRIP: NORMAL
MICROALBUMIN/CREAT UR TEST STR-RTO: <30
NITRITE UR QL STRIP: NORMAL
PH UR STRIP: 5
POCT - HEMOGLOBIN (HGB), QUANTITATIVE, TRANSCUTANEOUS: 16.1
PROT UR STRIP-MCNC: NORMAL
SP GR UR STRIP: >=1.03

## 2023-03-08 PROCEDURE — 81003 URINALYSIS AUTO W/O SCOPE: CPT | Mod: QW

## 2023-03-08 PROCEDURE — 88738 HGB QUANT TRANSCUTANEOUS: CPT

## 2023-03-08 PROCEDURE — 94727 GAS DIL/WSHOT DETER LNG VOL: CPT

## 2023-03-08 PROCEDURE — 94729 DIFFUSING CAPACITY: CPT

## 2023-03-08 PROCEDURE — 36415 COLL VENOUS BLD VENIPUNCTURE: CPT

## 2023-03-08 PROCEDURE — 82044 UR ALBUMIN SEMIQUANTITATIVE: CPT | Mod: QW

## 2023-03-08 PROCEDURE — 94010 BREATHING CAPACITY TEST: CPT

## 2023-03-08 PROCEDURE — 99396 PREV VISIT EST AGE 40-64: CPT | Mod: 25

## 2023-03-08 NOTE — HISTORY OF PRESENT ILLNESS
[TextBox_4] : Post Influenza A\par inc GERD sxs RS PPI GI noted with scheduled endoscopy\par \par Post COVID-19 infection\par > April 2022\par  \par states  prior EKG with  cardio Shree Waller MD \par Ca score 0\par carotids  neg\par recommended to hold lipid  lowering agents\par weight 240 post  15 ib weight loss\par \par \par Status: The patient reports his symptoms are stable since the last visit. \par Interval Symptoms: denies dyspnea on exertion, denies exercise intolerance, denies coughing, denies wheezing, denies lower extremity edema, denies chest pain, denies fever. \par \par The patient is a 49 year old male with a history of obstructive sleep apnea. \par Diagnostic Polysomnogram:. Date of most recent diagnostic polysomnogram: 1/20/2016. \par Mild PACO Not on active treatment. \par Complaint states onset past Sunday developed GI symptomatology with abdominal cramping diarrhea\par No reported nausea\par No reported fever\par No's sweats or chills\par No urinary symptomatology\par No localized abdominal pain noted\par Denies any burping indigestion or reflux\par No recent travel\par Ho hx COVID

## 2023-03-08 NOTE — PROCEDURE
[FreeTextEntry1] : PFT 3/8/23\par  miguel nl flow  rates \par lung volumes nl\par  DLCo 78 % WNL\par HGB 16.1\par \par PFT 5/25/22\par flow rates nl\par  Lung  volumes nl\par  DLCO 80 % WNL\par  HGB 15.2\par \par Chest x-ray PA lateral April 13, 2022\par Cardiac size normal\par Lung fields are clear\par No parenchymal infiltrates pleural effusions or dominant pulmonary nodules\par Soft tissue bony structures unremarkable\par No evidence for COVID pneumonia\par \par Chest x-ray PA lateral April 15, 2021\par Cardiac size abnormal\par Clear lung fields\par No parenchymal infiltrates pleural effusions or dominant pulmonary nodules\par Soft tissue bony structures unremarkable\par Comparison to prior chest x-rays without interval change\par \par EKG 4/15/21\par  NSR\par \par Chest x-ray PA lateral March 15, 2021\par Normal cardiac size\par No parenchymal infiltrates pleural effusions or dominant pulmonary nodules\par Soft tissue bony structures unremarkable\par Impression clear lungs\par \par PFT without bronchodilator December 23, 2019\par Flow rates normal\par Lung volumes total capacity 77% predicted minimally reduced\par Mild reduction diffusion 66% of predicted.\par Hemoglobin 14.7\par \par Echocardiogram April 22, 2019\par Normal left ventricle normal right ventricle no evidence of pulmonary hypertension\par \par Flu vaccination administered 9/9/2022

## 2023-03-08 NOTE — DISCUSSION/SUMMARY
[FreeTextEntry1] : recurrent GERD sxs- GI \par contionue  Prilosec for EGD colonoscopy \par Post Influenza A\par COVID 19 Infection  hx\par stable pulmonary physiology\par GERD Aty CP with incomplete response to PPI- resolved with tx\par recurrent PE\par  Xarelto- 10 mg QD Hold 3 days prior tp GI procedure\par cardiology consult with f/u noted  verbal report noted\par Borderline hypertension\par Recheck cholesterol\par Weight loss\par Readdress issue of obstructive sleep apnea-he did complete a sleep study January 20 and 21, 2016 with an overall AHI of 5\par PFIZER Covid Vaccine  completed  x 2 doses with advised COVID  booster\par \par flu shot up  to  date - \par watch lipid profile\par f/u PFT 3  months' cont weight loss \par

## 2023-03-09 ENCOUNTER — NON-APPOINTMENT (OUTPATIENT)
Age: 53
End: 2023-03-09

## 2023-03-09 LAB
ALBUMIN SERPL ELPH-MCNC: 4.5 G/DL
ALP BLD-CCNC: 54 U/L
ALT SERPL-CCNC: 22 U/L
ANION GAP SERPL CALC-SCNC: 14 MMOL/L
AST SERPL-CCNC: 20 U/L
BASOPHILS # BLD AUTO: 0.03 K/UL
BASOPHILS NFR BLD AUTO: 0.6 %
BILIRUB DIRECT SERPL-MCNC: 0.2 MG/DL
BILIRUB INDIRECT SERPL-MCNC: 0.3 MG/DL
BILIRUB SERPL-MCNC: 0.5 MG/DL
BUN SERPL-MCNC: 19 MG/DL
CALCIUM SERPL-MCNC: 9.9 MG/DL
CHLORIDE SERPL-SCNC: 104 MMOL/L
CHOLEST SERPL-MCNC: 220 MG/DL
CO2 SERPL-SCNC: 22 MMOL/L
CREAT SERPL-MCNC: 1.13 MG/DL
EGFR: 78 ML/MIN/1.73M2
EOSINOPHIL # BLD AUTO: 0.09 K/UL
EOSINOPHIL NFR BLD AUTO: 1.9 %
ESTIMATED AVERAGE GLUCOSE: 114 MG/DL
GLUCOSE SERPL-MCNC: 109 MG/DL
HBA1C MFR BLD HPLC: 5.6 %
HCT VFR BLD CALC: 47.2 %
HDLC SERPL-MCNC: 56 MG/DL
HGB BLD-MCNC: 15.4 G/DL
IMM GRANULOCYTES NFR BLD AUTO: 0.4 %
LDLC SERPL CALC-MCNC: 126 MG/DL
LYMPHOCYTES # BLD AUTO: 2.11 K/UL
LYMPHOCYTES NFR BLD AUTO: 43.4 %
MAN DIFF?: NORMAL
MCHC RBC-ENTMCNC: 32.4 PG
MCHC RBC-ENTMCNC: 32.6 GM/DL
MCV RBC AUTO: 99.2 FL
MONOCYTES # BLD AUTO: 0.33 K/UL
MONOCYTES NFR BLD AUTO: 6.8 %
NEUTROPHILS # BLD AUTO: 2.28 K/UL
NEUTROPHILS NFR BLD AUTO: 46.9 %
NONHDLC SERPL-MCNC: 164 MG/DL
PLATELET # BLD AUTO: 238 K/UL
POTASSIUM SERPL-SCNC: 4.9 MMOL/L
PROT SERPL-MCNC: 7.8 G/DL
PSA SERPL-MCNC: 1.18 NG/ML
RBC # BLD: 4.76 M/UL
RBC # FLD: 12.6 %
SODIUM SERPL-SCNC: 140 MMOL/L
T4 SERPL-MCNC: 9.4 UG/DL
TRIGL SERPL-MCNC: 192 MG/DL
TSH SERPL-ACNC: 3 UIU/ML
WBC # FLD AUTO: 4.86 K/UL

## 2023-06-08 ENCOUNTER — RX RENEWAL (OUTPATIENT)
Age: 53
End: 2023-06-08

## 2023-07-03 ENCOUNTER — APPOINTMENT (OUTPATIENT)
Dept: PULMONOLOGY | Facility: CLINIC | Age: 53
End: 2023-07-03
Payer: COMMERCIAL

## 2023-07-03 VITALS — OXYGEN SATURATION: 97 % | HEART RATE: 99 BPM | SYSTOLIC BLOOD PRESSURE: 134 MMHG | DIASTOLIC BLOOD PRESSURE: 87 MMHG

## 2023-07-03 DIAGNOSIS — E78.5 HYPERLIPIDEMIA, UNSPECIFIED: ICD-10-CM

## 2023-07-03 PROCEDURE — 99213 OFFICE O/P EST LOW 20 MIN: CPT

## 2023-07-03 NOTE — DISCUSSION/SUMMARY
[FreeTextEntry1] : recurrent GERD sxs- GI \par contionue  Prilosec for EGD colonoscopy \par Post Influenza A\par COVID 19 Infection  hx\par stable pulmonary physiology\par GERD Aty CP with incomplete response to PPI- resolved with tx\par recurrent PE\par  Xarelto- 10 mg QD Hold 3 days prior tp GI procedure\par cardiology consult with f/u noted  verbal report noted\par Borderline hypertension\par Recheck cholesterol\par Weight loss\par Readdress issue of obstructive sleep apnea-he did complete a sleep study January 20 and 21, 2016 with an overall AHI of 5\par PFIZER Covid Vaccine  completed  x 2 doses with advised COVID  booster\par \par flu shot up  to  date - \par watch lipid profile\par f/u PFT 3  months' cont weight loss \par  cardiac clearance completed with Cardiology

## 2023-07-03 NOTE — HISTORY OF PRESENT ILLNESS
[TextBox_4] : B/L soreness at TMJ jaw\par felt something in tooth and usingh tongue  and felt like muscle pulled\par \par Post Influenza A\par inc GERD sxs RS PPI GI noted with scheduled endoscopy\par \par Post COVID-19 infection\par > April 2022\par  \par states  prior EKG with  cardio Shree Waller MD \par Ca score 0\par carotids  neg\par recommended to hold lipid  lowering agents\par weight 240 post  15 ib weight loss\par \par \par Status: The patient reports his symptoms are stable since the last visit. \par Interval Symptoms: denies dyspnea on exertion, denies exercise intolerance, denies coughing, denies wheezing, denies lower extremity edema, denies chest pain, denies fever. \par \par The patient is a 49 year old male with a history of obstructive sleep apnea. \par Diagnostic Polysomnogram:. Date of most recent diagnostic polysomnogram: 1/20/2016. \par Mild PACO Not on active treatment. \par Complaint states onset past Sunday developed GI symptomatology with abdominal cramping diarrhea\par No reported nausea\par No reported fever\par No's sweats or chills\par No urinary symptomatology\par No localized abdominal pain noted\par Denies any burping indigestion or reflux\par No recent travel\par Ho hx COVID

## 2023-07-13 ENCOUNTER — APPOINTMENT (OUTPATIENT)
Dept: GASTROENTEROLOGY | Facility: AMBULATORY MEDICAL SERVICES | Age: 53
End: 2023-07-13
Payer: COMMERCIAL

## 2023-07-13 PROCEDURE — 45378 DIAGNOSTIC COLONOSCOPY: CPT | Mod: 33

## 2023-07-13 PROCEDURE — 43239 EGD BIOPSY SINGLE/MULTIPLE: CPT | Mod: 59

## 2023-07-21 NOTE — DISCUSSION/SUMMARY
[FreeTextEntry1] : recurrent PE\par long term coumadin\par 7.5 mg x 1 night\par  and other nites 5 mg  6 nights\par  has declined change to xarelto\par \par Cardiology evaluation review March 7, 2019\par Recommendations of cardiology noted including echocardiogram possible cardiac catheterization CT calcium score\par Borderline hypertension\par Recheck cholesterol\par As noted he did decline use of novel anticoagulation therapy\par Weight loss\par Readdress issue of obstructive sleep apnea-he did complete a sleep study January 20 and 21, 2016 with an overall AHI of 5\par  Patient requests all Lab, Cardiology, and Radiology Results on their Discharge Instructions

## 2023-08-27 ENCOUNTER — NON-APPOINTMENT (OUTPATIENT)
Age: 53
End: 2023-08-27

## 2023-08-28 ENCOUNTER — APPOINTMENT (OUTPATIENT)
Dept: PULMONOLOGY | Facility: CLINIC | Age: 53
End: 2023-08-28
Payer: COMMERCIAL

## 2023-08-28 VITALS — HEART RATE: 88 BPM | SYSTOLIC BLOOD PRESSURE: 122 MMHG | DIASTOLIC BLOOD PRESSURE: 86 MMHG | OXYGEN SATURATION: 96 %

## 2023-08-28 LAB — DEPRECATED D DIMER PPP IA-ACNC: <150 NG/ML DDU

## 2023-08-28 PROCEDURE — 99214 OFFICE O/P EST MOD 30 MIN: CPT | Mod: 25

## 2023-08-28 PROCEDURE — 71046 X-RAY EXAM CHEST 2 VIEWS: CPT

## 2023-08-28 PROCEDURE — 94010 BREATHING CAPACITY TEST: CPT

## 2023-08-28 NOTE — HISTORY OF PRESENT ILLNESS
[Never] : never [TextBox_4] : c/o 2 + weeks  URI sxs pos  sputum light yellow no  fever no wheeze on chest pain  also long  car drive and hx embolic disease concern re DVT noted low davis xarelto  Post Influenza A Hx inc GERD sxs RS PPI GI noted with scheduled endoscopy  Post COVID-19 infection > April 2022   states  prior EKG with  cardio Shree Waller MD  Ca score 0 carotids  neg recommended to hold lipid  lowering agents weight 240 post  15 ib weight loss   Status: The patient reports his symptoms are stable since the last visit.  Interval Symptoms: denies dyspnea on exertion, denies exercise intolerance, denies coughing, denies wheezing, denies lower extremity edema, denies chest pain, denies fever.   The patient is a 49 year old male with a history of obstructive sleep apnea.  Diagnostic Polysomnogram:. Date of most recent diagnostic polysomnogram: 1/20/2016.  Mild PACO Not on active treatment.  Complaint states onset past Sunday developed GI symptomatology with abdominal cramping diarrhea No reported nausea No reported fever No's sweats or chills No urinary symptomatology No localized abdominal pain noted Denies any burping indigestion or reflux No recent travel Ho hx COVID

## 2023-08-28 NOTE — PROCEDURE
[FreeTextEntry1] : Chest x-ray PA lateral August 28, 2023 Medication cough URI symptoms Cardiac size normal Clear lung fields No parenchymal infiltrate pleural effusion dominant pulmonary nodules No evidence for pneumonia  Spirometry no bronchodilator 1/28/2023 Very mild obstructive impairment Significant decline–less than 500 mL at the FEV1.     PFT 3/8/23  miguel nl flow  rates  lung volumes nl  DLCo 78 % WNL HGB 16.1  PFT 5/25/22 flow rates nl  Lung  volumes nl  DLCO 80 % WNL  HGB 15.2  Chest x-ray PA lateral April 13, 2022 Cardiac size normal Lung fields are clear No parenchymal infiltrates pleural effusions or dominant pulmonary nodules Soft tissue bony structures unremarkable No evidence for COVID pneumonia  Chest x-ray PA lateral April 15, 2021 Cardiac size abnormal Clear lung fields No parenchymal infiltrates pleural effusions or dominant pulmonary nodules Soft tissue bony structures unremarkable Comparison to prior chest x-rays without interval change  EKG 4/15/21  NSR  Chest x-ray PA lateral March 15, 2021 Normal cardiac size No parenchymal infiltrates pleural effusions or dominant pulmonary nodules Soft tissue bony structures unremarkable Impression clear lungs  PFT without bronchodilator December 23, 2019 Flow rates normal Lung volumes total capacity 77% predicted minimally reduced Mild reduction diffusion 66% of predicted. Hemoglobin 14.7  Echocardiogram April 22, 2019 Normal left ventricle normal right ventricle no evidence of pulmonary hypertension  Flu vaccination administered 9/9/2022  blood draw

## 2023-08-28 NOTE — DISCUSSION/SUMMARY
[FreeTextEntry1] : URI chest congestion component of asthmatic bronchitis Rapid COVID antigen negative x2 Treatment protocol Z-Bj Medrol Dosepak For completeness because of leg cramps in a patient with known history of embolic disease on low-dose Xarelto I Protocol we will recheck D-dimer   recurrent GERD sxs- GI  contionue  Prilosec for EGD colonoscopy  Post Influenza A COVID 19 Infection  hx stable pulmonary physiology GERD Aty CP with incomplete response to PPI- resolved with tx recurrent PE  Xarelto- 10 mg QD Hold 3 days prior tp GI procedure cardiology consult with f/u noted  verbal report noted Borderline hypertension Recheck cholesterol Weight loss Readdress issue of obstructive sleep apnea-he did complete a sleep study January 20 and 21, 2016 with an overall AHI of 5 PFIZER Covid Vaccine  completed  x 2 doses with advised COVID  booster  flu shot up  to  date -  watch lipid profile f/u PFT 3  months' cont weight loss   cardiac clearance completed with Cardiology

## 2023-08-29 LAB
ANION GAP SERPL CALC-SCNC: 12 MMOL/L
BUN SERPL-MCNC: 18 MG/DL
CALCIUM SERPL-MCNC: 9.7 MG/DL
CHLORIDE SERPL-SCNC: 105 MMOL/L
CO2 SERPL-SCNC: 23 MMOL/L
CREAT SERPL-MCNC: 1.1 MG/DL
EGFR: 81 ML/MIN/1.73M2
GLUCOSE SERPL-MCNC: 97 MG/DL
POTASSIUM SERPL-SCNC: 4.9 MMOL/L
SODIUM SERPL-SCNC: 141 MMOL/L

## 2023-09-06 ENCOUNTER — APPOINTMENT (OUTPATIENT)
Dept: GASTROENTEROLOGY | Facility: CLINIC | Age: 53
End: 2023-09-06
Payer: COMMERCIAL

## 2023-09-06 VITALS
HEIGHT: 72 IN | HEART RATE: 91 BPM | WEIGHT: 255 LBS | TEMPERATURE: 98.1 F | BODY MASS INDEX: 34.54 KG/M2 | SYSTOLIC BLOOD PRESSURE: 131 MMHG | DIASTOLIC BLOOD PRESSURE: 82 MMHG | OXYGEN SATURATION: 97 %

## 2023-09-06 DIAGNOSIS — K29.00 ACUTE GASTRITIS W/OUT BLEEDING: ICD-10-CM

## 2023-09-06 DIAGNOSIS — E66.9 OBESITY, UNSPECIFIED: ICD-10-CM

## 2023-09-06 DIAGNOSIS — K21.00 GASTRO-ESOPHAGEAL REFLUX DISEASE WITH ESOPHAGITIS, WITHOUT BLEEDING: ICD-10-CM

## 2023-09-06 PROCEDURE — 99214 OFFICE O/P EST MOD 30 MIN: CPT

## 2023-09-06 RX ORDER — METHYLPREDNISOLONE 4 MG/1
4 TABLET ORAL
Qty: 1 | Refills: 0 | Status: DISCONTINUED | COMMUNITY
Start: 2023-08-28 | End: 2023-09-06

## 2023-09-06 RX ORDER — AZITHROMYCIN 250 MG/1
250 TABLET, FILM COATED ORAL
Qty: 1 | Refills: 0 | Status: DISCONTINUED | COMMUNITY
Start: 2023-08-28 | End: 2023-09-06

## 2023-09-06 RX ORDER — SODIUM PICOSULFATE, MAGNESIUM OXIDE, AND ANHYDROUS CITRIC ACID 10; 3.5; 12 MG/160ML; G/160ML; G/160ML
10-3.5-12 MG-GM LIQUID ORAL
Qty: 1 | Refills: 0 | Status: DISCONTINUED | COMMUNITY
Start: 2022-11-28 | End: 2023-09-06

## 2023-09-06 NOTE — ASSESSMENT
[FreeTextEntry1] : Patient with colonoscopy that was normal.  He will not need another colonoscopy for about 10 years. Upper endoscopy revealed evidence of gastritis on biopsy.  H. pylori was negative.  When he has tried to reduce his omeprazole, he has had severe heartburn and chest pain radiating to the arms.  Since he takes omeprazole daily, he does not have any symptoms.  He has regained 25 pounds and will try and lose the weight.  When he loses the weight, we will try and reduce omeprazole to every other day and then transition to famotidine if possible.

## 2023-09-06 NOTE — REVIEW OF SYSTEMS
[As Noted in HPI] : as noted in HPI [Negative] : Endocrine [FreeTextEntry2] : Overweight [de-identified] : chronic anticoagulation

## 2023-09-06 NOTE — PHYSICAL EXAM

## 2023-09-06 NOTE — HISTORY OF PRESENT ILLNESS
[FreeTextEntry1] : Patient is a 53-year-old gentleman who had a bout of chest pain after drinking coffee in the evening.  The pain radiated to both arms.  On 1017, he presented to the emergency room and had a cardiogram, cardiac enzymes, and a chest x-ray.  These were normal.  Patient recently started on omeprazole and his symptoms slowly subsided.  The symptoms had lasted for about 3 weeks.  He had no diaphoresis or shortness of breath.  Blood work in the emergency room revealed normal LFTs.  H/H was 15/45. He had a similar episode in 2018.  He had lost about 20 pounds after that.  Omeprazole helped at that time.  He had an upper endoscopy that was essentially normal except for a patulous LES.  Patient's bowel movements are regular.  He does have a history of colon polyps that were tubular adenomas.  Patient has a history of DVT and PE many years ago.  He continues to take Xarelto on a regular basis.  9/6/2023-Once treatment is completed.  Patient is status post a colonoscopy and upper endoscopy on 7/137/13/2023. The colonoscopy was normal except for some internal hemorrhoids. The upper endoscopy revealed evidence of moderate gastritis on biopsy.  H. pylori was negative .  He is doing well on omeprazole 40 mg daily.  As long as he takes it every day, he has no symptoms.  He has recently regained his weight and is trying to lose 20 to 25 pounds again.

## 2023-09-06 NOTE — PLAN
[TextEntry] : When loses 20-25 lbs, will reduce Omeprazole to QOD and possibly transition to Famotidine.

## 2023-10-16 ENCOUNTER — APPOINTMENT (OUTPATIENT)
Dept: PULMONOLOGY | Facility: CLINIC | Age: 53
End: 2023-10-16
Payer: COMMERCIAL

## 2023-10-16 VITALS — OXYGEN SATURATION: 96 % | SYSTOLIC BLOOD PRESSURE: 122 MMHG | DIASTOLIC BLOOD PRESSURE: 81 MMHG | HEART RATE: 92 BPM

## 2023-10-16 DIAGNOSIS — Z23 ENCOUNTER FOR IMMUNIZATION: ICD-10-CM

## 2023-10-16 DIAGNOSIS — R05.9 COUGH, UNSPECIFIED: ICD-10-CM

## 2023-10-16 DIAGNOSIS — G47.33 OBSTRUCTIVE SLEEP APNEA (ADULT) (PEDIATRIC): ICD-10-CM

## 2023-10-16 DIAGNOSIS — I26.99 OTHER PULMONARY EMBOLISM W/OUT ACUTE COR PULMONALE: ICD-10-CM

## 2023-10-16 PROCEDURE — G0008: CPT

## 2023-10-16 PROCEDURE — 90686 IIV4 VACC NO PRSV 0.5 ML IM: CPT

## 2023-10-16 PROCEDURE — 99213 OFFICE O/P EST LOW 20 MIN: CPT | Mod: 25

## 2024-02-06 ENCOUNTER — APPOINTMENT (OUTPATIENT)
Dept: ORTHOPEDIC SURGERY | Facility: CLINIC | Age: 54
End: 2024-02-06
Payer: COMMERCIAL

## 2024-02-06 VITALS
WEIGHT: 255 LBS | HEIGHT: 72 IN | SYSTOLIC BLOOD PRESSURE: 145 MMHG | HEART RATE: 99 BPM | DIASTOLIC BLOOD PRESSURE: 89 MMHG | BODY MASS INDEX: 34.54 KG/M2

## 2024-02-06 DIAGNOSIS — M54.2 CERVICALGIA: ICD-10-CM

## 2024-02-06 PROCEDURE — 72040 X-RAY EXAM NECK SPINE 2-3 VW: CPT

## 2024-02-06 PROCEDURE — 99203 OFFICE O/P NEW LOW 30 MIN: CPT

## 2024-02-06 NOTE — DISCUSSION/SUMMARY
[de-identified] : The patient has cervical spondylosis.  He has had an exacerbation.  I have discussed the pathology, natural history and treatment options with him.  He is referred for physical therapy.  He will apply topical ointments for pain relief.  He will be reevaluated in 6 weeks.

## 2024-02-06 NOTE — PHYSICAL EXAM
[Normal] : Gait: normal [Rad] : radial 2+ and symmetric bilaterally [de-identified] : The patient has no respiratory distress. Mood and affect are normal. The patient is alert and oriented to person, place and time Examination of the cervical spine demonstrates no deformity. There is tenderness of the midline cervical spine. There is mild muscle spasm. Cervical spine range of motion is right lateral rotation of 40, left lateral rotation of 40, extension of 45 and flexion of 45. Upper extremity neurologic exam is intact with regard to sensation. Motor function is 5 over 5 in all groups in the upper extremities. Deep tendon reflexes are 2+ and equal at the biceps, triceps and brachial radialis. There is no pain with motion of the shoulders.  There is no shoulder instability.  There is no pain with elbow motion.  The skin is intact.  There is no lymphedema. [de-identified] : AP and lateral x-rays of the cervical spine demonstrate no fracture or dislocation.  There are mild degenerative changes.

## 2024-02-06 NOTE — HISTORY OF PRESENT ILLNESS
[de-identified] : 53-year-old RHD male  presents for initial evaluation of neck pain x 2 weeks. Denies trauma or injury. He complains of constant aching pain which progressively gets worse and sharper throughout the day. He feels the pain is midline with associated tightness in the shoulders as well. He has tried Tylenol and Advil on occasion, applies Icy Hot with some relief. He is on Xarelto and is unable to take NSAIDs. Denies paresthesias in B/L UE.

## 2024-02-08 ENCOUNTER — RX RENEWAL (OUTPATIENT)
Age: 54
End: 2024-02-08

## 2024-02-08 RX ORDER — RIVAROXABAN 10 MG/1
10 TABLET, FILM COATED ORAL
Qty: 90 | Refills: 3 | Status: ACTIVE | COMMUNITY
Start: 2019-10-01 | End: 1900-01-01

## 2024-02-12 ENCOUNTER — APPOINTMENT (OUTPATIENT)
Dept: PULMONOLOGY | Facility: CLINIC | Age: 54
End: 2024-02-12

## 2024-02-25 NOTE — PHYSICAL EXAM
Late note due to patient care.     07 Report received from CARLOS King. Care assumed at this time.  Patient  EDC  GA 39.4 presents with contractions. Patient reports no VB, LOF. Patient is currently reporting painful contractions. Primary c/s scheduled for 1000 with Dr. Weiner. POC discussed, questions answered.     1005 Patient arrival via ambulation to OR 1     1038 Delivery of viable male. 8/9 APGAR    1103 Arrival to PACU bed 1    1211 Patient transferred via gurney in apparent stable condition with infant in arms, and in possession of all belongings to postpartum unit. Report given to CARLOS Bojorquez. Band and cuddles verified. Lochia assessed and education on bleeding discussed. Transfer of care at this time.    [General Appearance - Alert] : alert [General Appearance - In No Acute Distress] : in no acute distress [Sclera] : the sclera and conjunctiva were normal [PERRL With Normal Accommodation] : pupils were equal in size, round, and reactive to light [Extraocular Movements] : extraocular movements were intact [Outer Ear] : the ears and nose were normal in appearance [Oropharynx] : the oropharynx was normal [Neck Appearance] : the appearance of the neck was normal [Neck Cervical Mass (___cm)] : no neck mass was observed [Jugular Venous Distention Increased] : there was no jugular-venous distention [Thyroid Diffuse Enlargement] : the thyroid was not enlarged [Thyroid Nodule] : there were no palpable thyroid nodules [Auscultation Breath Sounds / Voice Sounds] : lungs were clear to auscultation bilaterally [Heart Rate And Rhythm] : heart rate was normal and rhythm regular [Heart Sounds] : normal S1 and S2 [Heart Sounds Gallop] : no gallops [Murmurs] : no murmurs [Heart Sounds Pericardial Friction Rub] : no pericardial rub [Bowel Sounds] : normal bowel sounds [Abdomen Soft] : soft [Abdomen Tenderness] : non-tender [] : no hepato-splenomegaly [Abdomen Mass (___ Cm)] : no abdominal mass palpated [No CVA Tenderness] : no ~M costovertebral angle tenderness [No Spinal Tenderness] : no spinal tenderness [Abnormal Walk] : normal gait [Nail Clubbing] : no clubbing  or cyanosis of the fingernails [Musculoskeletal - Swelling] : no joint swelling seen [Motor Tone] : muscle strength and tone were normal [Oriented To Time, Place, And Person] : oriented to person, place, and time [Impaired Insight] : insight and judgment were intact [Affect] : the affect was normal

## 2024-03-06 ENCOUNTER — APPOINTMENT (OUTPATIENT)
Dept: GASTROENTEROLOGY | Facility: CLINIC | Age: 54
End: 2024-03-06

## 2024-05-02 ENCOUNTER — APPOINTMENT (OUTPATIENT)
Dept: PULMONOLOGY | Facility: CLINIC | Age: 54
End: 2024-05-02
Payer: COMMERCIAL

## 2024-05-02 VITALS — OXYGEN SATURATION: 96 % | HEART RATE: 87 BPM | DIASTOLIC BLOOD PRESSURE: 84 MMHG | SYSTOLIC BLOOD PRESSURE: 132 MMHG

## 2024-05-02 DIAGNOSIS — R06.02 SHORTNESS OF BREATH: ICD-10-CM

## 2024-05-02 DIAGNOSIS — R07.9 CHEST PAIN, UNSPECIFIED: ICD-10-CM

## 2024-05-02 DIAGNOSIS — Z79.01 LONG TERM (CURRENT) USE OF ANTICOAGULANTS: ICD-10-CM

## 2024-05-02 DIAGNOSIS — I26.09 CHRONIC PULMONARY EMBOLISM: ICD-10-CM

## 2024-05-02 DIAGNOSIS — I27.82 CHRONIC PULMONARY EMBOLISM: ICD-10-CM

## 2024-05-02 DIAGNOSIS — I82.409 ACUTE EMBOLISM AND THROMBOSIS OF UNSPECIFIED DEEP VEINS OF UNSPECIFIED LOWER EXTREMITY: ICD-10-CM

## 2024-05-02 DIAGNOSIS — R94.2 ABNORMAL RESULTS OF PULMONARY FUNCTION STUDIES: ICD-10-CM

## 2024-05-02 LAB — POCT - HEMOGLOBIN (HGB), QUANTITATIVE, TRANSCUTANEOUS: 14.9

## 2024-05-02 PROCEDURE — 94727 GAS DIL/WSHOT DETER LNG VOL: CPT

## 2024-05-02 PROCEDURE — 88738 HGB QUANT TRANSCUTANEOUS: CPT

## 2024-05-02 PROCEDURE — 99214 OFFICE O/P EST MOD 30 MIN: CPT | Mod: 25

## 2024-05-02 PROCEDURE — 71046 X-RAY EXAM CHEST 2 VIEWS: CPT

## 2024-05-02 PROCEDURE — 94729 DIFFUSING CAPACITY: CPT

## 2024-05-02 PROCEDURE — ZZZZZ: CPT

## 2024-05-02 PROCEDURE — 94060 EVALUATION OF WHEEZING: CPT

## 2024-05-02 RX ORDER — AZITHROMYCIN 250 MG/1
250 TABLET, FILM COATED ORAL DAILY
Qty: 1 | Refills: 0 | Status: ACTIVE | COMMUNITY
Start: 2024-05-02 | End: 1900-01-01

## 2024-05-02 RX ORDER — METHYLPREDNISOLONE 4 MG/1
4 TABLET ORAL
Qty: 21 | Refills: 0 | Status: ACTIVE | COMMUNITY
Start: 2024-05-02 | End: 1900-01-01

## 2024-05-02 NOTE — PROCEDURE
[FreeTextEntry1] : Chest x-ray PA lateral May 2, 2024 Indication chest congestion x 4-6 weeks Cardiac size normal Clear lung fields No parenchymal infiltrates pleural effusions or dominant pulmonary nodules Soft tissue bony structures unremarkable Comparison to an x-ray of August 28, 2023 does not demonstrate any interval change  PFT May 2, 2024 Spirometry normal No bronchodilator sponsor in FEV1 Lung volumes demonstrate very minimal decline at the TLC 76% predicted No air-trapping Diffusion normal range 78% predicted Hemoglobin 14.9   Chest x-ray PA lateral August 28, 2023 Medication cough URI symptoms Cardiac size normal Clear lung fields No parenchymal infiltrate pleural effusion dominant pulmonary nodules No evidence for pneumonia  Spirometry no bronchodilator 8/28/2023 Very mild obstructive impairment Significant decline-less than 500 mL at the FEV1.   PFT 3/8/23  miguel nl flow  rates  lung volumes nl  DLCo 78 % WNL HGB 16.1  PFT 5/25/22 flow rates nl  Lung  volumes nl  DLCO 80 % WNL  HGB 15.2  Chest x-ray PA lateral April 13, 2022 Cardiac size normal Lung fields are clear No parenchymal infiltrates pleural effusions or dominant pulmonary nodules Soft tissue bony structures unremarkable No evidence for COVID pneumonia  Chest x-ray PA lateral April 15, 2021 Cardiac size abnormal Clear lung fields No parenchymal infiltrates pleural effusions or dominant pulmonary nodules Soft tissue bony structures unremarkable Comparison to prior chest x-rays without interval change  EKG 4/15/21  NSR  Chest x-ray PA lateral March 15, 2021 Normal cardiac size No parenchymal infiltrates pleural effusions or dominant pulmonary nodules Soft tissue bony structures unremarkable Impression clear lungs  PFT without bronchodilator December 23, 2019 Flow rates normal Lung volumes total capacity 77% predicted minimally reduced Mild reduction diffusion 66% of predicted. Hemoglobin 14.7  Echocardiogram April 22, 2019 Normal left ventricle normal right ventricle no evidence of pulmonary hypertension  Flu vaccination administered 10/16/23

## 2024-05-02 NOTE — HISTORY OF PRESENT ILLNESS
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  Interval Symptoms: denies dyspnea on exertion, denies exercise intolerance, denies coughing, denies wheezing, denies lower extremity edema, denies chest pain, denies fever.   The patient is a 49 year old male with a history of obstructive sleep apnea.  Diagnostic Polysomnogram:. Date of most recent diagnostic polysomnogram: 1/20/2016.  Mild PACO Not on active treatment.  Complaint states onset past Sunday developed GI symptomatology with abdominal cramping diarrhea No reported nausea No reported fever No's sweats or chills No urinary symptomatology No localized abdominal pain noted Denies any burping indigestion or reflux No recent travel [place] hx COVID

## 2024-05-02 NOTE — DISCUSSION/SUMMARY
[FreeTextEntry1] : URI chest congestion component of asthmatic bronchitis will treat with short-term Medrol Dosepak and Z-Bj office follow-up 3 months sooner if no interval improvement Rapid COVID antigen negative x2 Treatment protocol Z-Bj Medrol Dosepak For completeness because of leg cramps in a patient with known history of embolic disease on low-dose Xarelto I  recurrent GERD sxs- GI  contionue  Prilosec for EGD colonoscopy  Post Influenza A COVID 19 Infection  hx stable pulmonary physiology GERD Aty CP with incomplete response to PPI- resolved with tx recurrent PE  Xarelto- 10 mg QD Hold 3 days prior tp GI procedure cardiology consult with f/u noted  verbal report noted Borderline hypertension Recheck cholesterol Weight loss Readdress issue of obstructive sleep apnea-he did complete a sleep study January 20 and 21, 2016 with an overall AHI of 5 PFIZER Covid Vaccine  completed  x 2 doses with advised COVID  booster  flu shot up  to  date -  watch lipid profile f/u PFT 3  months' cont weight loss   cardiac clearance completed with Cardiology

## 2024-06-03 ENCOUNTER — RX RENEWAL (OUTPATIENT)
Age: 54
End: 2024-06-03

## 2024-06-03 RX ORDER — OMEPRAZOLE 40 MG/1
40 CAPSULE, DELAYED RELEASE ORAL
Qty: 90 | Refills: 3 | Status: ACTIVE | COMMUNITY
Start: 2018-07-11 | End: 1900-01-01

## 2024-08-01 ENCOUNTER — APPOINTMENT (OUTPATIENT)
Dept: PULMONOLOGY | Facility: CLINIC | Age: 54
End: 2024-08-01

## 2024-12-24 PROBLEM — F10.90 ALCOHOL USE: Status: ACTIVE | Noted: 2017-10-26

## 2025-02-03 ENCOUNTER — RX RENEWAL (OUTPATIENT)
Age: 55
End: 2025-02-03

## 2025-05-07 ENCOUNTER — NON-APPOINTMENT (OUTPATIENT)
Age: 55
End: 2025-05-07

## 2025-05-09 ENCOUNTER — APPOINTMENT (OUTPATIENT)
Dept: PULMONOLOGY | Facility: CLINIC | Age: 55
End: 2025-05-09
Payer: COMMERCIAL

## 2025-05-09 VITALS — HEART RATE: 71 BPM | SYSTOLIC BLOOD PRESSURE: 134 MMHG | DIASTOLIC BLOOD PRESSURE: 93 MMHG | OXYGEN SATURATION: 99 %

## 2025-05-09 DIAGNOSIS — Z79.01 LONG TERM (CURRENT) USE OF ANTICOAGULANTS: ICD-10-CM

## 2025-05-09 DIAGNOSIS — G47.33 OBSTRUCTIVE SLEEP APNEA (ADULT) (PEDIATRIC): ICD-10-CM

## 2025-05-09 DIAGNOSIS — R94.2 ABNORMAL RESULTS OF PULMONARY FUNCTION STUDIES: ICD-10-CM

## 2025-05-09 DIAGNOSIS — I26.99 OTHER PULMONARY EMBOLISM W/OUT ACUTE COR PULMONALE: ICD-10-CM

## 2025-05-09 PROCEDURE — 71046 X-RAY EXAM CHEST 2 VIEWS: CPT

## 2025-05-09 PROCEDURE — 94729 DIFFUSING CAPACITY: CPT

## 2025-05-09 PROCEDURE — ZZZZZ: CPT

## 2025-05-09 PROCEDURE — 99214 OFFICE O/P EST MOD 30 MIN: CPT | Mod: 25

## 2025-05-09 PROCEDURE — 94727 GAS DIL/WSHOT DETER LNG VOL: CPT

## 2025-05-09 PROCEDURE — 94010 BREATHING CAPACITY TEST: CPT

## 2025-05-28 ENCOUNTER — RX RENEWAL (OUTPATIENT)
Age: 55
End: 2025-05-28

## 2025-06-11 ENCOUNTER — APPOINTMENT (OUTPATIENT)
Dept: INTERNAL MEDICINE | Facility: CLINIC | Age: 55
End: 2025-06-11
Payer: COMMERCIAL

## 2025-06-11 VITALS
BODY MASS INDEX: 34.54 KG/M2 | WEIGHT: 255 LBS | OXYGEN SATURATION: 95 % | TEMPERATURE: 98 F | SYSTOLIC BLOOD PRESSURE: 132 MMHG | DIASTOLIC BLOOD PRESSURE: 90 MMHG | HEIGHT: 72 IN | HEART RATE: 86 BPM | RESPIRATION RATE: 17 BRPM

## 2025-06-11 PROCEDURE — 99203 OFFICE O/P NEW LOW 30 MIN: CPT

## 2025-06-16 LAB
ALBUMIN SERPL ELPH-MCNC: 4.4 G/DL
ALP BLD-CCNC: 57 U/L
ALT SERPL-CCNC: 28 U/L
ANION GAP SERPL CALC-SCNC: 13 MMOL/L
AST SERPL-CCNC: 20 U/L
BASOPHILS # BLD AUTO: 0.02 K/UL
BASOPHILS NFR BLD AUTO: 0.4 %
BILIRUB SERPL-MCNC: 0.4 MG/DL
BUN SERPL-MCNC: 23 MG/DL
CALCIUM SERPL-MCNC: 9.6 MG/DL
CHLORIDE SERPL-SCNC: 106 MMOL/L
CHOLEST SERPL-MCNC: 212 MG/DL
CO2 SERPL-SCNC: 24 MMOL/L
CREAT SERPL-MCNC: 1.13 MG/DL
EGFRCR SERPLBLD CKD-EPI 2021: 77 ML/MIN/1.73M2
EOSINOPHIL # BLD AUTO: 0.06 K/UL
EOSINOPHIL NFR BLD AUTO: 1.1 %
ESTIMATED AVERAGE GLUCOSE: 114 MG/DL
GLUCOSE SERPL-MCNC: 109 MG/DL
HBA1C MFR BLD HPLC: 5.6 %
HCT VFR BLD CALC: 44.2 %
HDLC SERPL-MCNC: 54 MG/DL
HGB BLD-MCNC: 14.7 G/DL
IMM GRANULOCYTES NFR BLD AUTO: 0.2 %
LDLC SERPL-MCNC: 129 MG/DL
LYMPHOCYTES # BLD AUTO: 2.32 K/UL
LYMPHOCYTES NFR BLD AUTO: 41.7 %
MAN DIFF?: NORMAL
MCHC RBC-ENTMCNC: 33.3 G/DL
MCHC RBC-ENTMCNC: 33.5 PG
MCV RBC AUTO: 100.7 FL
MONOCYTES # BLD AUTO: 0.54 K/UL
MONOCYTES NFR BLD AUTO: 9.7 %
NEUTROPHILS # BLD AUTO: 2.61 K/UL
NEUTROPHILS NFR BLD AUTO: 46.9 %
NONHDLC SERPL-MCNC: 158 MG/DL
PLATELET # BLD AUTO: 205 K/UL
POTASSIUM SERPL-SCNC: 4.6 MMOL/L
PROT SERPL-MCNC: 7 G/DL
RBC # BLD: 4.39 M/UL
RBC # FLD: 13.2 %
SODIUM SERPL-SCNC: 143 MMOL/L
TRIGL SERPL-MCNC: 165 MG/DL
TSH SERPL-ACNC: 1.71 UIU/ML
WBC # FLD AUTO: 5.56 K/UL

## 2025-06-16 RX ORDER — TIRZEPATIDE 2.5 MG/.5ML
2.5 INJECTION, SOLUTION SUBCUTANEOUS
Qty: 1 | Refills: 0 | Status: ACTIVE | COMMUNITY
Start: 2025-06-16 | End: 1900-01-01

## 2025-06-19 RX ORDER — TIRZEPATIDE 2.5 MG/.5ML
2.5 INJECTION, SOLUTION SUBCUTANEOUS
Qty: 2 | Refills: 0 | Status: ACTIVE | COMMUNITY
Start: 2025-06-19 | End: 1900-01-01

## 2025-06-23 ENCOUNTER — NON-APPOINTMENT (OUTPATIENT)
Age: 55
End: 2025-06-23

## 2025-07-09 ENCOUNTER — APPOINTMENT (OUTPATIENT)
Dept: CARDIOLOGY | Facility: CLINIC | Age: 55
End: 2025-07-09
Payer: COMMERCIAL

## 2025-07-09 VITALS — BODY MASS INDEX: 33.46 KG/M2 | HEIGHT: 72 IN | WEIGHT: 247 LBS

## 2025-07-09 PROCEDURE — 97802 MEDICAL NUTRITION INDIV IN: CPT | Mod: 95

## 2025-07-28 ENCOUNTER — APPOINTMENT (OUTPATIENT)
Dept: INTERNAL MEDICINE | Facility: CLINIC | Age: 55
End: 2025-07-28
Payer: COMMERCIAL

## 2025-07-28 DIAGNOSIS — E66.9 OBESITY, UNSPECIFIED: ICD-10-CM

## 2025-07-28 DIAGNOSIS — G47.33 OBSTRUCTIVE SLEEP APNEA (ADULT) (PEDIATRIC): ICD-10-CM

## 2025-07-28 PROCEDURE — 99213 OFFICE O/P EST LOW 20 MIN: CPT | Mod: 95

## 2025-08-28 ENCOUNTER — APPOINTMENT (OUTPATIENT)
Dept: INTERNAL MEDICINE | Facility: CLINIC | Age: 55
End: 2025-08-28

## 2025-09-03 ENCOUNTER — APPOINTMENT (OUTPATIENT)
Dept: CARDIOLOGY | Facility: CLINIC | Age: 55
End: 2025-09-03

## 2025-09-11 ENCOUNTER — APPOINTMENT (OUTPATIENT)
Dept: INTERNAL MEDICINE | Facility: CLINIC | Age: 55
End: 2025-09-11

## 2025-09-11 ENCOUNTER — TRANSCRIPTION ENCOUNTER (OUTPATIENT)
Age: 55
End: 2025-09-11